# Patient Record
Sex: MALE | Race: WHITE | NOT HISPANIC OR LATINO | URBAN - METROPOLITAN AREA
[De-identification: names, ages, dates, MRNs, and addresses within clinical notes are randomized per-mention and may not be internally consistent; named-entity substitution may affect disease eponyms.]

---

## 2019-08-06 ENCOUNTER — OFFICE VISIT (OUTPATIENT)
Dept: FAMILY MEDICINE | Facility: CLINIC | Age: 59
End: 2019-08-06
Payer: COMMERCIAL

## 2019-08-06 VITALS
BODY MASS INDEX: 28.88 KG/M2 | DIASTOLIC BLOOD PRESSURE: 90 MMHG | HEIGHT: 70 IN | SYSTOLIC BLOOD PRESSURE: 126 MMHG | TEMPERATURE: 98 F | OXYGEN SATURATION: 96 % | WEIGHT: 201.75 LBS | HEART RATE: 77 BPM

## 2019-08-06 DIAGNOSIS — E11.9 TYPE 2 DIABETES MELLITUS WITHOUT COMPLICATION, WITHOUT LONG-TERM CURRENT USE OF INSULIN: ICD-10-CM

## 2019-08-06 DIAGNOSIS — I10 ESSENTIAL HYPERTENSION: Primary | ICD-10-CM

## 2019-08-06 DIAGNOSIS — C09.9 SQUAMOUS CELL CARCINOMA OF LEFT TONSIL: ICD-10-CM

## 2019-08-06 PROCEDURE — 99999 PR PBB SHADOW E&M-NEW PATIENT-LVL IV: CPT | Mod: PBBFAC,,, | Performed by: INTERNAL MEDICINE

## 2019-08-06 PROCEDURE — 3008F BODY MASS INDEX DOCD: CPT | Mod: CPTII,S$GLB,, | Performed by: INTERNAL MEDICINE

## 2019-08-06 PROCEDURE — 3008F PR BODY MASS INDEX (BMI) DOCUMENTED: ICD-10-PCS | Mod: CPTII,S$GLB,, | Performed by: INTERNAL MEDICINE

## 2019-08-06 PROCEDURE — 99203 PR OFFICE/OUTPT VISIT, NEW, LEVL III, 30-44 MIN: ICD-10-PCS | Mod: S$GLB,,, | Performed by: INTERNAL MEDICINE

## 2019-08-06 PROCEDURE — 99203 OFFICE O/P NEW LOW 30 MIN: CPT | Mod: S$GLB,,, | Performed by: INTERNAL MEDICINE

## 2019-08-06 PROCEDURE — 99999 PR PBB SHADOW E&M-NEW PATIENT-LVL IV: ICD-10-PCS | Mod: PBBFAC,,, | Performed by: INTERNAL MEDICINE

## 2019-08-06 RX ORDER — METFORMIN HYDROCHLORIDE 500 MG/1
500 TABLET ORAL
Refills: 2 | COMMUNITY
Start: 2019-07-25 | End: 2019-10-21 | Stop reason: SDUPTHER

## 2019-08-06 RX ORDER — HYDROCHLOROTHIAZIDE 12.5 MG/1
12.5 TABLET ORAL
COMMUNITY
Start: 2019-07-16 | End: 2019-10-21 | Stop reason: SDUPTHER

## 2019-08-06 RX ORDER — LOSARTAN POTASSIUM 100 MG/1
100 TABLET ORAL
Refills: 1 | COMMUNITY
Start: 2019-07-23 | End: 2019-09-23 | Stop reason: SDUPTHER

## 2019-08-06 NOTE — PROGRESS NOTES
[facility] Note    Chief Complaint      Chief Complaint   Patient presents with    [facility]     new pcp    Hypertension     check up    Diabetes     check up       History of Present Illness      Cristhian Gregory is a 58 y.o. male who presents today for   Chief Complaint   Patient presents with    [facility]     new pcp    Hypertension     check up    Diabetes     check up       No recent PCP.  Has been seeing ENT for cancer diagnosis.  Had elevated BP on preop exam.  Started on losartan and HCTZ by ENT.  Also had elevated  on preop labs.  No previous diabetes diagnosis.  Started on metformin.      Will be getting dental procedure and needs clearance.      Problem List Items Addressed This Visit     None      Visit Diagnoses     Essential hypertension    -  Primary    Relevant Orders    CBC auto differential    Comprehensive metabolic panel    Hemoglobin A1c    Microalbumin/creatinine urine ratio    TSH    T4, free    Ambulatory consult to Diabetic Education    Type 2 diabetes mellitus without complication, without long-term current use of insulin        Relevant Medications    metFORMIN (GLUCOPHAGE) 500 MG tablet    Other Relevant Orders    CBC auto differential    Comprehensive metabolic panel    Hemoglobin A1c    Microalbumin/creatinine urine ratio    TSH    T4, free    Ambulatory consult to Diabetic Education    Squamous cell carcinoma of left tonsil        Relevant Orders    CBC auto differential    Comprehensive metabolic panel    TSH    T4, free        HTN: BP better controlled on losartan, hctz.  Home monitoring reviewed with pt.  110-120s/70-80s.    DM2: new diagnosis.  Started on metformin.  A1C due.  Microalbuminuria due.  Diabetes education referral.  Eye exam UTD with [facility] 2019.    SCC, throat 2012: Sees Dr. Warren, ENT.  Had radiation with Dr. Gastelum for lymph node involvement.  Surveillance at this point twice yearly with Dr. Warren.      Past  Medical History:  Past Medical History:   Diagnosis Date    Diabetes mellitus, type 2     Hypertension        Past Surgical History:  History reviewed. No pertinent surgical history.    Family History:  family history includes Diabetes in his mother; Heart disease in his father; No Known Problems in his brother and sister.     Social History:  Social History     Socioeconomic History    Marital status:      Spouse name: Not on file    Number of children: Not on file    Years of education: Not on file    Highest education level: Not on file   Occupational History    Not on file   Social Needs    Financial resource strain: Not on file    Food insecurity:     Worry: Not on file     Inability: Not on file    Transportation needs:     Medical: Not on file     Non-medical: Not on file   Tobacco Use    Smoking status: Never Smoker   Substance and Sexual Activity    Alcohol use: Never     Frequency: Never    Drug use: Never    Sexual activity: Not on file   Lifestyle    Physical activity:     Days per week: Not on file     Minutes per session: Not on file    Stress: Not on file   Relationships    Social connections:     Talks on phone: Not on file     Gets together: Not on file     Attends Congregational service: Not on file     Active member of club or organization: Not on file     Attends meetings of clubs or organizations: Not on file     Relationship status: Not on file   Other Topics Concern    Not on file   Social History Narrative    Not on file       Review of Systems   Constitutional: Negative for chills, fever and malaise/fatigue.   Respiratory: Negative for shortness of breath.    Cardiovascular: Negative for chest pain.   Gastrointestinal: Negative for constipation, diarrhea, nausea and vomiting.   Skin: Negative for rash.   Neurological: Negative for weakness.        Medications:  Outpatient Encounter Medications as of 8/6/2019   Medication Sig Dispense Refill    hydroCHLOROthiazide  "(HYDRODIURIL) 12.5 MG Tab 12.5 mg. 1 TAB PO QD      losartan (COZAAR) 100 MG tablet 100 mg. 1 tab po qd  1    metFORMIN (GLUCOPHAGE) 500 MG tablet 500 mg. 1 tab BID  2     No facility-administered encounter medications on file as of 8/6/2019.        Allergies:  Review of patient's allergies indicates:   Allergen Reactions    Ampicillin        Health Maintenance:  Health Maintenance   Topic Date Due    Hepatitis C Screening  1960    Lipid Panel  1960    TETANUS VACCINE  12/19/1980    Colonoscopy  12/19/2012     The patient has no Health Maintenance topics of status Not Due    Physical Exam      Vital Signs  Temp: 97.5 °F (36.4 °C)  Temp src: Oral  Pulse: 77  SpO2: 96 %  BP: (!) 126/90  BP Location: Right arm  Patient Position: Sitting  Pain Score: 0-No pain  Height and Weight  Height: 5' 10" (177.8 cm)  Weight: 91.5 kg (201 lb 11.5 oz)  BSA (Calculated - sq m): 2.13 sq meters  BMI (Calculated): 29  Weight in (lb) to have BMI = 25: 173.9]    Physical Exam   Constitutional: He is oriented to person, place, and time. He appears well-developed and well-nourished.   HENT:   Head: Normocephalic and atraumatic.   Right Ear: External ear normal.   Left Ear: External ear normal.   Mouth/Throat: Oropharynx is clear and moist.   Eyes: Pupils are equal, round, and reactive to light. Conjunctivae and EOM are normal.   Cardiovascular: Normal rate, regular rhythm, normal heart sounds and intact distal pulses.   No murmur heard.  Pulmonary/Chest: Effort normal and breath sounds normal. He has no wheezes. He has no rales.   Abdominal: Soft. Bowel sounds are normal.   Musculoskeletal: Normal range of motion. He exhibits no edema.   Neurological: He is alert and oriented to person, place, and time.   Skin: No rash noted.   Psychiatric: He has a normal mood and affect. His behavior is normal.   Nursing note and vitals reviewed.       Laboratory:  CBC:  No results for input(s): WBC, RBC, HGB, HCT, PLT, MCV, MCH, MCHC " in the last 2160 hours.  CMP:  No results for input(s): GLU, CALCIUM, ALBUMIN, PROT, NA, K, CO2, CL, BUN, ALKPHOS, ALT, AST, BILITOT in the last 2160 hours.    Invalid input(s): CREATININ  URINALYSIS:  No results for input(s): COLORU, CLARITYU, SPECGRAV, PHUR, PROTEINUA, GLUCOSEU, BILIRUBINCON, BLOODU, WBCU, RBCU, BACTERIA, MUCUS, NITRITE, LEUKOCYTESUR, UROBILINOGEN, HYALINECASTS in the last 2160 hours.   LIPIDS:  No results for input(s): TSH, HDL, CHOL, TRIG, LDLCALC, CHOLHDL, NONHDLCHOL, TOTALCHOLEST in the last 2160 hours.  TSH:  No results for input(s): TSH in the last 2160 hours.  A1C:  No results for input(s): HGBA1C in the last 2160 hours.    Radiology:      Assessment/Plan     Cristhian Gregory is a 58 y.o.male with:    Essential hypertension, improving  -     CBC auto differential; Future; Expected date: 08/06/2019  -     Comprehensive metabolic panel; Future; Expected date: 08/06/2019  -     Hemoglobin A1c; Future; Expected date: 08/06/2019  -     Microalbumin/creatinine urine ratio  -     TSH; Future; Expected date: 08/06/2019  -     T4, free; Future; Expected date: 08/06/2019  -     Ambulatory consult to Diabetic Education    Type 2 diabetes mellitus without complication, without long-term current use of insulin, improving  -     CBC auto differential; Future; Expected date: 08/06/2019  -     Comprehensive metabolic panel; Future; Expected date: 08/06/2019  -     Hemoglobin A1c; Future; Expected date: 08/06/2019  -     Microalbumin/creatinine urine ratio  -     TSH; Future; Expected date: 08/06/2019  -     T4, free; Future; Expected date: 08/06/2019  -     Ambulatory consult to Diabetic Education    Squamous cell carcinoma of left tonsil, stable  -     CBC auto differential; Future; Expected date: 08/06/2019  -     Comprehensive metabolic panel; Future; Expected date: 08/06/2019  -     TSH; Future; Expected date: 08/06/2019  -     T4, free; Future; Expected date: 08/06/2019        As above, continue  current medications and maintain follow up with specialists.  Will clear for dental procedure pending labs.  Return to clinic in 3 months.    Bethel Echols MD  [facility]

## 2019-08-07 ENCOUNTER — LAB VISIT (OUTPATIENT)
Dept: LAB | Facility: HOSPITAL | Age: 59
End: 2019-08-07
Attending: INTERNAL MEDICINE
Payer: COMMERCIAL

## 2019-08-07 ENCOUNTER — DOCUMENTATION ONLY (OUTPATIENT)
Dept: FAMILY MEDICINE | Facility: CLINIC | Age: 59
End: 2019-08-07

## 2019-08-07 ENCOUNTER — TELEPHONE (OUTPATIENT)
Dept: FAMILY MEDICINE | Facility: CLINIC | Age: 59
End: 2019-08-07

## 2019-08-07 DIAGNOSIS — E11.9 TYPE 2 DIABETES MELLITUS WITHOUT COMPLICATION, WITHOUT LONG-TERM CURRENT USE OF INSULIN: ICD-10-CM

## 2019-08-07 DIAGNOSIS — C09.9 SQUAMOUS CELL CARCINOMA OF LEFT TONSIL: ICD-10-CM

## 2019-08-07 DIAGNOSIS — I10 ESSENTIAL HYPERTENSION: ICD-10-CM

## 2019-08-07 LAB
ALBUMIN SERPL BCP-MCNC: 4.1 G/DL (ref 3.5–5.2)
ALP SERPL-CCNC: 55 U/L (ref 55–135)
ALT SERPL W/O P-5'-P-CCNC: 26 U/L (ref 10–44)
ANION GAP SERPL CALC-SCNC: 11 MMOL/L (ref 8–16)
AST SERPL-CCNC: 21 U/L (ref 10–40)
BASOPHILS # BLD AUTO: 0.02 K/UL (ref 0–0.2)
BASOPHILS NFR BLD: 0.4 % (ref 0–1.9)
BILIRUB SERPL-MCNC: 0.5 MG/DL (ref 0.1–1)
BUN SERPL-MCNC: 22 MG/DL (ref 6–20)
CALCIUM SERPL-MCNC: 9.7 MG/DL (ref 8.7–10.5)
CHLORIDE SERPL-SCNC: 101 MMOL/L (ref 95–110)
CO2 SERPL-SCNC: 26 MMOL/L (ref 23–29)
CREAT SERPL-MCNC: 1.3 MG/DL (ref 0.5–1.4)
DIFFERENTIAL METHOD: NORMAL
EOSINOPHIL # BLD AUTO: 0.1 K/UL (ref 0–0.5)
EOSINOPHIL NFR BLD: 1.9 % (ref 0–8)
ERYTHROCYTE [DISTWIDTH] IN BLOOD BY AUTOMATED COUNT: 12.6 % (ref 11.5–14.5)
EST. GFR  (AFRICAN AMERICAN): >60 ML/MIN/1.73 M^2
EST. GFR  (NON AFRICAN AMERICAN): >60 ML/MIN/1.73 M^2
ESTIMATED AVG GLUCOSE: 183 MG/DL (ref 68–131)
GLUCOSE SERPL-MCNC: 149 MG/DL (ref 70–110)
HBA1C MFR BLD HPLC: 8 % (ref 4–5.6)
HCT VFR BLD AUTO: 44.8 % (ref 40–54)
HGB BLD-MCNC: 14.9 G/DL (ref 14–18)
IMM GRANULOCYTES # BLD AUTO: 0.01 K/UL (ref 0–0.04)
IMM GRANULOCYTES NFR BLD AUTO: 0.2 % (ref 0–0.5)
LYMPHOCYTES # BLD AUTO: 1.9 K/UL (ref 1–4.8)
LYMPHOCYTES NFR BLD: 32.8 % (ref 18–48)
MCH RBC QN AUTO: 28.4 PG (ref 27–31)
MCHC RBC AUTO-ENTMCNC: 33.3 G/DL (ref 32–36)
MCV RBC AUTO: 85 FL (ref 82–98)
MONOCYTES # BLD AUTO: 0.5 K/UL (ref 0.3–1)
MONOCYTES NFR BLD: 9.3 % (ref 4–15)
NEUTROPHILS # BLD AUTO: 3.2 K/UL (ref 1.8–7.7)
NEUTROPHILS NFR BLD: 55.4 % (ref 38–73)
NRBC BLD-RTO: 0 /100 WBC
PLATELET # BLD AUTO: 242 K/UL (ref 150–350)
PMV BLD AUTO: 10.8 FL (ref 9.2–12.9)
POTASSIUM SERPL-SCNC: 4 MMOL/L (ref 3.5–5.1)
PROT SERPL-MCNC: 7.7 G/DL (ref 6–8.4)
RBC # BLD AUTO: 5.25 M/UL (ref 4.6–6.2)
SODIUM SERPL-SCNC: 138 MMOL/L (ref 136–145)
T4 FREE SERPL-MCNC: 0.91 NG/DL (ref 0.71–1.51)
TSH SERPL DL<=0.005 MIU/L-ACNC: 2.05 UIU/ML (ref 0.4–4)
WBC # BLD AUTO: 5.7 K/UL (ref 3.9–12.7)

## 2019-08-07 PROCEDURE — 84439 ASSAY OF FREE THYROXINE: CPT

## 2019-08-07 PROCEDURE — 83036 HEMOGLOBIN GLYCOSYLATED A1C: CPT

## 2019-08-07 PROCEDURE — 84443 ASSAY THYROID STIM HORMONE: CPT

## 2019-08-07 PROCEDURE — 80053 COMPREHEN METABOLIC PANEL: CPT

## 2019-08-07 PROCEDURE — 85025 COMPLETE CBC W/AUTO DIFF WBC: CPT

## 2019-08-07 PROCEDURE — 36415 COLL VENOUS BLD VENIPUNCTURE: CPT | Mod: PN

## 2019-08-07 NOTE — TELEPHONE ENCOUNTER
----- Message from Bethel Echols MD sent at 8/7/2019  3:37 PM CDT -----  A1C is 8.0 which is elevated as expected.  This test is a 3 month average so starting metformin will need more time to show up in results.  Also will be seeing diabetic education which will help as well.  Cont current meds.  Recheck in 3 months as planned.

## 2019-08-13 ENCOUNTER — DOCUMENTATION ONLY (OUTPATIENT)
Dept: FAMILY MEDICINE | Facility: CLINIC | Age: 59
End: 2019-08-13

## 2019-08-20 ENCOUNTER — PATIENT MESSAGE (OUTPATIENT)
Dept: PRIMARY CARE CLINIC | Facility: CLINIC | Age: 59
End: 2019-08-20

## 2019-08-28 ENCOUNTER — TELEPHONE (OUTPATIENT)
Dept: FAMILY MEDICINE | Facility: CLINIC | Age: 59
End: 2019-08-28

## 2019-08-28 NOTE — TELEPHONE ENCOUNTER
Discussed with pt and Dr. Wakefield.  BP elevated during office visit.  Home readings have been great.  Not sure what caused this spike other than nervousness about getting dental work done.  Recommended pt come in for BP check on Friday at [facility].

## 2019-08-28 NOTE — TELEPHONE ENCOUNTER
----- Message from Eladia Cordova MA sent at 8/28/2019  9:30 AM CDT -----  Contact: Dr Sherwin Wakefield      ----- Message -----  From: Jose Mixon  Sent: 8/28/2019   9:24 AM  To: [facility] Staff    Dr Wakefield office called in about wanting to speak with . Pt is in the office right now        Dr office can be reached at [phone]        TY   no

## 2019-08-30 ENCOUNTER — CLINICAL SUPPORT (OUTPATIENT)
Dept: PRIMARY CARE CLINIC | Facility: CLINIC | Age: 59
End: 2019-08-30
Payer: COMMERCIAL

## 2019-08-30 ENCOUNTER — TELEPHONE (OUTPATIENT)
Dept: PRIMARY CARE CLINIC | Facility: CLINIC | Age: 59
End: 2019-08-30

## 2019-08-30 VITALS — SYSTOLIC BLOOD PRESSURE: 128 MMHG | DIASTOLIC BLOOD PRESSURE: 88 MMHG

## 2019-08-30 DIAGNOSIS — I10 ESSENTIAL HYPERTENSION: Primary | ICD-10-CM

## 2019-08-30 PROCEDURE — 99999 PR PBB SHADOW E&M-EST. PATIENT-LVL I: ICD-10-PCS | Mod: PBBFAC,,,

## 2019-08-30 PROCEDURE — 99999 PR PBB SHADOW E&M-EST. PATIENT-LVL I: CPT | Mod: PBBFAC,,,

## 2019-09-17 ENCOUNTER — TELEPHONE (OUTPATIENT)
Dept: FAMILY MEDICINE | Facility: CLINIC | Age: 59
End: 2019-09-17

## 2019-09-17 ENCOUNTER — CLINICAL SUPPORT (OUTPATIENT)
Dept: DIABETES | Facility: CLINIC | Age: 59
End: 2019-09-17
Payer: COMMERCIAL

## 2019-09-17 DIAGNOSIS — E11.9 TYPE 2 DIABETES MELLITUS WITHOUT COMPLICATION, WITHOUT LONG-TERM CURRENT USE OF INSULIN: ICD-10-CM

## 2019-09-17 DIAGNOSIS — E11.65 UNCONTROLLED TYPE 2 DIABETES MELLITUS WITH HYPERGLYCEMIA: Primary | ICD-10-CM

## 2019-09-17 PROCEDURE — G0108 PR DIAB MANAGE TRN  PER INDIV: ICD-10-PCS | Mod: S$GLB,,, | Performed by: DIETITIAN, REGISTERED

## 2019-09-17 PROCEDURE — G0108 DIAB MANAGE TRN  PER INDIV: HCPCS | Mod: S$GLB,,, | Performed by: DIETITIAN, REGISTERED

## 2019-09-17 RX ORDER — INSULIN PUMP SYRINGE, 3 ML
EACH MISCELLANEOUS
Qty: 1 EACH | Refills: 0 | Status: SHIPPED | OUTPATIENT
Start: 2019-09-17 | End: 2020-09-16

## 2019-09-17 RX ORDER — LANCETS
1 EACH MISCELLANEOUS DAILY
Qty: 100 EACH | Refills: 11 | Status: SHIPPED | OUTPATIENT
Start: 2019-09-17 | End: 2020-09-21 | Stop reason: SDUPTHER

## 2019-09-17 NOTE — PROGRESS NOTES
Diabetes Education  Author: Yulissa Pineda RD, CDE  Date: 9/17/2019    Diabetes Care Management Summary  Diabetes Education Record Assessment/Progress: Initial     Diabetes Type  Diabetes Type : Type II    Diabetes History  Diabetes Diagnosis: 0-1 year  Current Treatment: Oral Medication    Health Maintenance was reviewed today with patient. Discussed with patient importance of routine eye exams, foot exams/foot care, blood work (i.e.: A1c, microalbumin, and lipid), dental visits, yearly flu vaccine, and pneumonia vaccine as indicated by PCP. Patient verbalized understanding.     Health Maintenance Topics with due status: Not Due       Topic Last Completion Date    Hemoglobin A1c 08/07/2019     Health Maintenance Due   Topic Date Due    Hepatitis C Screening  1960    Lipid Panel  1962    Foot Exam  12/19/1972    Eye Exam  12/19/1972    TETANUS VACCINE  12/19/1980    Pneumococcal Vaccine (Highest Risk) (1 of 3 - PCV13) 12/19/1981    Low Dose Statin  12/19/1983    Colonoscopy  12/19/2012     Nutrition  Meal Planning: 3 meals per day, snacks between meal, water, eats out seldom  What type of sweetener do you use?: none, honey  What type of beverages do you drink?: water, sport drinks, other (see comments)(SF sport drinks, vitamin water, Martha; coffee, tea; u/s almond milk)    Monitoring   Self Monitoring : (None - needs a meter)  Blood Glucose Logs: No  Do you use a personal continuous glucose monitor?: No  In the last month, how often have you had a low blood sugar reaction?: never  What are your symptoms of low blood sugar?: (N/A)  How do you treat low blood sugar?: (N/A)  Can you tell when your blood sugar is too high?: yes(Fatigue)  How do you treat high blood sugar?: (None)    Exercise   Exercise Type: (Plays golf, swim and active with job)    Current Diabetes Treatment   Current Treatment: Oral Medication    Social History  Preferred Learning Method: Face to Face  Primary Support:  Self  Smoking Status: Never a Smoker  Alcohol Use: Never    Barriers to Change  Barriers to Change: None  Learning Challenges : None    Readiness to Learn   Readiness to Learn : Acceptance    Cultural Influences  Cultural Influences: No    Diabetes Education Assessment/Progress  Diabetes Disease Process (diabetes disease process and treatment options): Instructed, Discussion, Individual Session, Written Materials Provided, Demonstrates Understanding/Competency(verbalizes/demonstrates)  Nutrition (Incorporating nutritional management into one's lifestyle): Instructed, Discussion, Individual Session, Written Materials Provided, Demonstrates Understanding/Competency (verbalizes/demonstrates)(Reviewed CHO counting, label reading and addt'l resources to assist w/ CHO counting; plate method reviewed)  Physical Activity (incorporating physical activity into one's lifestyle): Instructed, Discussion, Individual Session, Written Materials Provided, Demonstrates Understanding/Competency (verbalizes/demonstrates)(Reviewed goals and benefits)  Medications (states correct name, dose, onset, peak, duration, side effects & timing of meds): Instructed, Discussion, Individual Session, Written Materials Provided, Demonstrates Understanding/Competency(verbalizes/demonstrates)(Reviewed medication regimen)  Monitoring (monitoring blood glucose/other parameters & using results): Instructed, Discussion, Individual Session, Written Materials Provided, Demonstrates Understanding/Competency (verbalizes/demonstrates)(Reviewed SMBG schedule and BG goals; Rx request sent to PCP for meter/supplies)  Acute Complications (preventing, detecting, and treating acute complications): Instructed, Discussion, Individual Session, Written Materials Provided, Demonstrates Understanding/Competency (verbalizes/demonstrates)(Reviewed s/s and treatment of hypoglycemia)  Chronic Complications (preventing, detecting, and treating chronic complications):  Instructed, Discussion, Individual Session, Written Materials Provided, Demonstrates Understanding/Competency (verbalizes/demonstrates)(Reviewed standards of care)  Clinical (diabetes, other pertinent medical history, and relevant comorbidities reviewed during visit): Discussion, Comprehends Key Points  Cognitive (knowledge of self-management skills, functional health literacy): Discussion, Comprehends Key Points  Psychosocial (emotional response to diabetes): Discussion, Comprehends Key Points  Diabetes Distress and Support Systems: Not Covered/Deferred(New Onset/Time constraints)  Behavioral (readiness for change, lifestyle practices, self-care behaviors): Discussion, Comprehends Key Points    Goals  Patient has selected/evaluated goals during today's session: Yes, selected  Monitoring: Set(Check BG daily)    Diabetes Care Plan/Intervention  Education Plan/Intervention: Individual Follow-Up DSMT    Diabetes Meal Plan  Carbohydrate Per Meal: 30-45g  Carbohydrate Per Snack : 7-15g    Today's Self-Management Care Plan was developed with the patient's input and is based on barriers identified during today's assessment.    The long and short-term goals in the care plan were written with the patient/caregiver's input. The patient has agreed to work toward these goals to improve his overall diabetes control.      The patient received a copy of today's self-management plan and verbalized understanding of the care plan, goals, and all of today's instructions.      The patient was encouraged to communicate with his physician and care team regarding his condition(s) and treatment.  I provided the patient with my contact information today and encouraged him to contact me via phone or patient portal as needed.     Education Units of Time   Time Spent: 60 min

## 2019-09-18 NOTE — TELEPHONE ENCOUNTER
----- Message from Jose Mixon sent at 9/18/2019  9:30 AM CDT -----  Contact: [facility] - Dentist  Dr Courtney called in about wanting to speak with .  would like to discuss something with dr about pt        Dr Courtney can be reached at [phone] ty

## 2019-09-18 NOTE — TELEPHONE ENCOUNTER
Called and discussed with patient and his dentist.  Will take an extra half of losartan evening before dental work.  Otherwise BPs have been great

## 2019-09-23 ENCOUNTER — TELEPHONE (OUTPATIENT)
Dept: FAMILY MEDICINE | Facility: CLINIC | Age: 59
End: 2019-09-23

## 2019-09-23 RX ORDER — LOSARTAN POTASSIUM 100 MG/1
100 TABLET ORAL DAILY
Qty: 30 TABLET | Refills: 5 | Status: SHIPPED | OUTPATIENT
Start: 2019-09-23 | End: 2019-10-21 | Stop reason: SDUPTHER

## 2019-09-23 NOTE — TELEPHONE ENCOUNTER
----- Message from Doris Solis sent at 9/23/2019  4:14 PM CDT -----  Contact: Patient [phone]  Patient has an abscess tooth and needs clearance.    Please call and advise.    Thank You

## 2019-09-23 NOTE — TELEPHONE ENCOUNTER
On Friday the dentist rx'd an abx (clindamycin 150mg 1 po qid) for an abscess on his tooth. Pt just needs a clearance to have the tooth extracted because he was recently dx'd with diabetes. Dentist is , [phone]    Pt is also asking for refill on losartan 100mg 1 po qd to be sent to [facility] in chart.

## 2019-09-24 NOTE — TELEPHONE ENCOUNTER
Tried calling back, no answer, left message.  What has his blood sugars been running for the past week? It doesn't sound like this extraction is elective so we should be able to karlie clearance quickly.

## 2019-09-24 NOTE — TELEPHONE ENCOUNTER
----- Message from Bertha Murphy sent at 9/24/2019 12:23 PM CDT -----  Contact: PT  Pt would like to be called back regarding a tooth abstract     Pt can be reached at  [phone]

## 2019-09-25 ENCOUNTER — PATIENT MESSAGE (OUTPATIENT)
Dept: PRIMARY CARE CLINIC | Facility: CLINIC | Age: 59
End: 2019-09-25

## 2019-09-27 DIAGNOSIS — E11.9 TYPE 2 DIABETES MELLITUS WITHOUT COMPLICATION: ICD-10-CM

## 2019-10-21 ENCOUNTER — PATIENT MESSAGE (OUTPATIENT)
Dept: PRIMARY CARE CLINIC | Facility: CLINIC | Age: 59
End: 2019-10-21

## 2019-10-21 RX ORDER — HYDROCHLOROTHIAZIDE 12.5 MG/1
12.5 TABLET ORAL DAILY
Qty: 30 TABLET | Refills: 5 | Status: SHIPPED | OUTPATIENT
Start: 2019-10-21 | End: 2020-04-30 | Stop reason: SDUPTHER

## 2019-10-21 RX ORDER — METFORMIN HYDROCHLORIDE 500 MG/1
500 TABLET ORAL 2 TIMES DAILY WITH MEALS
Qty: 60 TABLET | Refills: 5 | Status: SHIPPED | OUTPATIENT
Start: 2019-10-21 | End: 2020-06-19 | Stop reason: SDUPTHER

## 2019-10-21 RX ORDER — LOSARTAN POTASSIUM 100 MG/1
100 TABLET ORAL DAILY
Qty: 30 TABLET | Refills: 5 | Status: SHIPPED | OUTPATIENT
Start: 2019-10-21 | End: 2020-04-17 | Stop reason: SDUPTHER

## 2019-10-25 ENCOUNTER — PATIENT OUTREACH (OUTPATIENT)
Dept: ADMINISTRATIVE | Facility: HOSPITAL | Age: 59
End: 2019-10-25

## 2019-10-25 DIAGNOSIS — E11.9 TYPE 2 DIABETES MELLITUS WITHOUT COMPLICATION, WITHOUT LONG-TERM CURRENT USE OF INSULIN: Primary | ICD-10-CM

## 2019-10-25 DIAGNOSIS — Z11.59 NEED FOR HEPATITIS C SCREENING TEST: ICD-10-CM

## 2019-11-19 ENCOUNTER — TELEPHONE (OUTPATIENT)
Dept: FAMILY MEDICINE | Facility: CLINIC | Age: 59
End: 2019-11-19

## 2019-11-19 NOTE — TELEPHONE ENCOUNTER
----- Message from Bethel Echols MD sent at 11/19/2019  2:30 PM CST -----  Regarding: Scheduling labs  Can we find out when and where pt intends to have his repeat labs drawn?  He has a lipid panel, repeat A1C and hep C antibody to be done.  Has appt on 11/25

## 2019-11-25 ENCOUNTER — IMMUNIZATION (OUTPATIENT)
Dept: PHARMACY | Facility: CLINIC | Age: 59
End: 2019-11-25

## 2019-11-25 ENCOUNTER — OFFICE VISIT (OUTPATIENT)
Dept: PRIMARY CARE CLINIC | Facility: CLINIC | Age: 59
End: 2019-11-25
Payer: COMMERCIAL

## 2019-11-25 ENCOUNTER — LAB VISIT (OUTPATIENT)
Dept: LAB | Facility: HOSPITAL | Age: 59
End: 2019-11-25
Attending: INTERNAL MEDICINE
Payer: COMMERCIAL

## 2019-11-25 ENCOUNTER — IMMUNIZATION (OUTPATIENT)
Dept: PHARMACY | Facility: CLINIC | Age: 59
End: 2019-11-25
Payer: COMMERCIAL

## 2019-11-25 VITALS
SYSTOLIC BLOOD PRESSURE: 124 MMHG | WEIGHT: 199.31 LBS | TEMPERATURE: 98 F | BODY MASS INDEX: 28.53 KG/M2 | HEART RATE: 87 BPM | OXYGEN SATURATION: 97 % | DIASTOLIC BLOOD PRESSURE: 80 MMHG | HEIGHT: 70 IN

## 2019-11-25 DIAGNOSIS — E11.9 TYPE 2 DIABETES MELLITUS WITHOUT COMPLICATION, WITHOUT LONG-TERM CURRENT USE OF INSULIN: ICD-10-CM

## 2019-11-25 DIAGNOSIS — Z11.59 NEED FOR HEPATITIS C SCREENING TEST: ICD-10-CM

## 2019-11-25 DIAGNOSIS — K02.9 DENTAL CARIES: Primary | ICD-10-CM

## 2019-11-25 DIAGNOSIS — I10 ESSENTIAL HYPERTENSION: ICD-10-CM

## 2019-11-25 DIAGNOSIS — Z12.11 SCREEN FOR COLON CANCER: ICD-10-CM

## 2019-11-25 DIAGNOSIS — C09.9 SQUAMOUS CELL CARCINOMA OF LEFT TONSIL: ICD-10-CM

## 2019-11-25 DIAGNOSIS — E11.9 TYPE 2 DIABETES MELLITUS WITHOUT COMPLICATION: ICD-10-CM

## 2019-11-25 LAB
CHOLEST SERPL-MCNC: 204 MG/DL (ref 120–199)
CHOLEST/HDLC SERPL: 5.5 {RATIO} (ref 2–5)
ESTIMATED AVG GLUCOSE: 134 MG/DL (ref 68–131)
HBA1C MFR BLD HPLC: 6.3 % (ref 4–5.6)
HDLC SERPL-MCNC: 37 MG/DL (ref 40–75)
HDLC SERPL: 18.1 % (ref 20–50)
LDLC SERPL CALC-MCNC: 120.8 MG/DL (ref 63–159)
NONHDLC SERPL-MCNC: 167 MG/DL
TRIGL SERPL-MCNC: 231 MG/DL (ref 30–150)

## 2019-11-25 PROCEDURE — 99214 PR OFFICE/OUTPT VISIT, EST, LEVL IV, 30-39 MIN: ICD-10-PCS | Mod: S$GLB,,, | Performed by: INTERNAL MEDICINE

## 2019-11-25 PROCEDURE — 99999 PR PBB SHADOW E&M-EST. PATIENT-LVL IV: ICD-10-PCS | Mod: PBBFAC,,, | Performed by: INTERNAL MEDICINE

## 2019-11-25 PROCEDURE — 83036 HEMOGLOBIN GLYCOSYLATED A1C: CPT

## 2019-11-25 PROCEDURE — 3008F BODY MASS INDEX DOCD: CPT | Mod: CPTII,S$GLB,, | Performed by: INTERNAL MEDICINE

## 2019-11-25 PROCEDURE — 80061 LIPID PANEL: CPT

## 2019-11-25 PROCEDURE — 3008F PR BODY MASS INDEX (BMI) DOCUMENTED: ICD-10-PCS | Mod: CPTII,S$GLB,, | Performed by: INTERNAL MEDICINE

## 2019-11-25 PROCEDURE — 99214 OFFICE O/P EST MOD 30 MIN: CPT | Mod: S$GLB,,, | Performed by: INTERNAL MEDICINE

## 2019-11-25 PROCEDURE — 36415 COLL VENOUS BLD VENIPUNCTURE: CPT | Mod: PN

## 2019-11-25 PROCEDURE — 99999 PR PBB SHADOW E&M-EST. PATIENT-LVL IV: CPT | Mod: PBBFAC,,, | Performed by: INTERNAL MEDICINE

## 2019-11-25 PROCEDURE — 86803 HEPATITIS C AB TEST: CPT

## 2019-11-25 RX ORDER — HYDROCODONE BITARTRATE AND ACETAMINOPHEN 7.5; 325 MG/1; MG/1
1 TABLET ORAL EVERY 4 HOURS PRN
Refills: 0 | COMMUNITY
Start: 2019-11-21 | End: 2020-03-02

## 2019-11-25 RX ORDER — IBUPROFEN 600 MG/1
600 TABLET ORAL EVERY 6 HOURS PRN
Refills: 0 | COMMUNITY
Start: 2019-11-21 | End: 2020-03-02

## 2019-11-25 NOTE — PROGRESS NOTES
[facility] Note    Chief Complaint      Chief Complaint   Patient presents with    Diabetes     3 month follow up       History of Present Illness      Cristhian Gregory is a 58 y.o. male with chronic conditions of DM2, HTN, squamous cell carcinoma left tonsil who presents today for: follow up diabetes. Has been doing well on metformin twice daily.  FBG reviewed.  -144 but have been  throughout the days.  A1C drawn this morning.  Has cut back on carbs and sweets.    Has had dental extractions.  Has more extensive lower extractions and implants scheduled in January.  At this point, his diabetes control has been improved enough to optimize wound healing.  Discussed statin indication.    Eye exam UTD with Dr. Villa, scanned.    HTN: BP at goal on losartan.  Flu shot due.  TdAP due.  Shingles vaccine due age 62.  Pneumovax due, will get at next visit.  Prevnar due age 67.    Cscope due.  Will refer.      Past Medical History:  Past Medical History:   Diagnosis Date    Diabetes mellitus, type 2     Hypertension        Past Surgical History:   has no past surgical history on file.    Family History:  family history includes Cancer in his mother; Diabetes in his mother; Heart disease in his father; No Known Problems in his brother and sister.     Social History:  Social History     Tobacco Use    Smoking status: Never Smoker   Substance Use Topics    Alcohol use: Never     Frequency: Never    Drug use: Never       Review of Systems   Constitutional: Negative for chills, fever, malaise/fatigue and weight loss.   Eyes: Negative for blurred vision.   Respiratory: Negative for shortness of breath.    Cardiovascular: Negative for chest pain.   Gastrointestinal: Negative for constipation, diarrhea, nausea and vomiting.   Skin: Negative for rash.   Neurological: Negative for dizziness, seizures and weakness.   Endo/Heme/Allergies: Negative for polydipsia.        Medications:  Outpatient Encounter  Medications as of 11/25/2019   Medication Sig Dispense Refill    blood sugar diagnostic Strp 1 each by Misc.(Non-Drug; Combo Route) route once daily. 100 each 11    blood-glucose meter kit Use as instructed 1 each 0    hydroCHLOROthiazide (HYDRODIURIL) 12.5 MG Tab Take 1 tablet (12.5 mg total) by mouth once daily. 1 TAB PO QD 30 tablet 5    HYDROcodone-acetaminophen (NORCO) 7.5-325 mg per tablet Take 1 tablet by mouth every 4 (four) hours as needed. for pain.  0    ibuprofen (ADVIL,MOTRIN) 600 MG tablet Take 600 mg by mouth every 6 (six) hours as needed.  0    lancets Misc 1 each by Misc.(Non-Drug; Combo Route) route once daily. 100 each 11    losartan (COZAAR) 100 MG tablet Take 1 tablet (100 mg total) by mouth once daily. 1 tab po qd 30 tablet 5    metFORMIN (GLUCOPHAGE) 500 MG tablet Take 1 tablet (500 mg total) by mouth 2 (two) times daily with meals. 1 tab BID 60 tablet 5    [DISCONTINUED] diphth,pertus,acell,,tetanus (BOOSTRIX TDAP) 2.5-8-5 Lf-mcg-Lf/0.5mL Syrg injection Inject 0.5 mLs into the muscle once. For one dose. for 1 dose 0.5 mL 0    [DISCONTINUED] flu vacc [id number] 6mos up,PF, (FLUARIX QUAD [id number], PF,) 60 mcg (15 mcg x 4)/0.5 mL Syrg Inject 0.5 mLs into the muscle once. For one dose. for 1 dose 0.5 mL 0    [DISCONTINUED] pneumococcal 23-latricia ps (PNEUMOVAX 23) 25 mcg/0.5 mL Inject into the muscle. 0.5 mL 0    [DISCONTINUED] varicella-zoster [id number], PF, (SHINGRIX, PF,) 50 mcg/0.5 mL injection Inject 0.5 mLs into the muscle once. For one dose. for 1 dose 0.5 mL 0     No facility-administered encounter medications on file as of 11/25/2019.        Allergies:  Review of patient's allergies indicates:   Allergen Reactions    Ampicillin        Health Maintenance:    There is no immunization history on file for this patient.   Health Maintenance   Topic Date Due    Hepatitis C Screening  1960    Lipid Panel  1960    Foot Exam  12/19/1972    TETANUS VACCINE  12/19/1980     "Pneumococcal Vaccine (Highest Risk) (1 of 3 - PCV13) 12/19/1981    Low Dose Statin  12/19/1983    Colonoscopy  12/19/2012    Hemoglobin A1c  11/07/2019    Eye Exam  06/26/2020        Physical Exam      Vital Signs  Temp: 97.8 °F (36.6 °C)  Temp src: Oral  Pulse: 87  SpO2: 97 %  BP: 124/80  BP Location: Left arm  Patient Position: Sitting  Height and Weight  Height: 5' 10" (177.8 cm)  Weight: 90.4 kg (199 lb 4.7 oz)  BSA (Calculated - sq m): 2.11 sq meters  BMI (Calculated): 28.6  Weight in (lb) to have BMI = 25: 173.9]    Physical Exam   Constitutional: He appears well-developed and well-nourished.   HENT:   Head: Normocephalic and atraumatic.   Right Ear: External ear normal.   Left Ear: External ear normal.   Mouth/Throat: Oropharynx is clear and moist.   Eyes: Pupils are equal, round, and reactive to light. Conjunctivae and EOM are normal.   Cardiovascular: Normal rate, regular rhythm, normal heart sounds and intact distal pulses.   No murmur heard.  Pulses:       Dorsalis pedis pulses are 2+ on the right side, and 2+ on the left side.        Posterior tibial pulses are 2+ on the right side, and 2+ on the left side.   Pulmonary/Chest: Effort normal and breath sounds normal. He has no wheezes. He has no rales.   Abdominal: Soft. Bowel sounds are normal. He exhibits no distension and no abdominal bruit. There is no hepatosplenomegaly. There is no tenderness.   Musculoskeletal:        Right foot: There is normal range of motion and no deformity.        Left foot: There is normal range of motion and no deformity.   Feet:   Right Foot:   Protective Sensation: 5 sites tested. 5 sites sensed.   Skin Integrity: Negative for ulcer.   Left Foot:   Protective Sensation: 5 sites tested. 5 sites sensed.   Skin Integrity: Negative for ulcer.   Vitals reviewed.       Laboratory:  CBC:  Recent Labs   Lab 08/07/19  0752   WBC 5.70   RBC 5.25   Hemoglobin 14.9   Hematocrit 44.8   Platelets 242   Mean Corpuscular Volume 85 "   Mean Corpuscular Hemoglobin 28.4   Mean Corpuscular Hemoglobin Conc 33.3     CMP:  Recent Labs   Lab 08/07/19  0752   Glucose 149 H   Calcium 9.7   Albumin 4.1   Total Protein 7.7   Sodium 138   Potassium 4.0   CO2 26   Chloride 101   BUN, Bld 22 H   Alkaline Phosphatase 55   ALT 26   AST 21   Total Bilirubin 0.5     URINALYSIS:       LIPIDS:  Recent Labs   Lab 08/07/19  0752   TSH 2.054     TSH:  Recent Labs   Lab 08/07/19  0752   TSH 2.054     A1C:  Recent Labs   Lab 08/07/19  0752   Hemoglobin A1C 8.0 H       Assessment/Plan     Cristhian Gregory is a 58 y.o.male with:    1. Type 2 diabetes mellitus without complication, without long-term current use of insulin  - Basic metabolic panel; Future  - Hemoglobin A1c; Future  Continue current meds.  Waiting on labs drawn this morning.  2. Essential hypertension  Continue current meds.    3. Squamous cell carcinoma of left tonsil  Cont follow up.    4. Dental caries  Cont follow up.   5. Screen for colon cancer  - Ambulatory referral to Gastroenterology       Chronic conditions status updated as per HPI.  Other than changes above, cont current medications and maintain follow up with specialists.  Return to clinic in 3 months.    Bethel Echols MD  [facility]                Answers for HPI/ROS submitted by the patient on 11/25/2019   Diabetes problem  Diabetes type: type 2  MedicAlert ID: No  Disease duration: 4 months  fatigue: No  foot paresthesias: No  foot ulcerations: No  polyphagia: No  polyuria: No  visual change: No  Symptom course: improving  confusion: No  hunger: No  mood changes: No  pallor: No  speech difficulty: No  sweats: No  blackouts: No  hospitalization: No  required assistance: No  required glucagon: No  CVA: No  heart disease: No  nephropathy: No  peripheral neuropathy: No  retinopathy: No  autonomic neuropathy: No  CAD risks: hypertension  Current treatments: oral agent (monotherapy)  Treatment compliance: all of the time  Dose schedule:  pre-breakfast, pre-dinner  Given by: patient  Home blood tests: 1-2 x per day  Monitoring compliance: excellent  Blood glucose trend: decreasing steadily  Weight trend: stable  Current diet: generally healthy  Meal planning: carbohydrate counting  Exercise: daily  Dietitian visit: Yes  Eye exam current: Yes  Sees podiatrist: No

## 2019-11-26 ENCOUNTER — TELEPHONE (OUTPATIENT)
Dept: FAMILY MEDICINE | Facility: CLINIC | Age: 59
End: 2019-11-26

## 2019-11-26 DIAGNOSIS — E11.9 TYPE 2 DIABETES MELLITUS WITHOUT COMPLICATION, WITHOUT LONG-TERM CURRENT USE OF INSULIN: Primary | ICD-10-CM

## 2019-11-26 RX ORDER — ATORVASTATIN CALCIUM 10 MG/1
10 TABLET, FILM COATED ORAL DAILY
Qty: 90 TABLET | Refills: 3 | Status: SHIPPED | OUTPATIENT
Start: 2019-11-26 | End: 2020-11-18 | Stop reason: SDUPTHER

## 2019-11-26 NOTE — PROGRESS NOTES
Diabetes control is excellent.  Ok to stop evening dose of metformin and calling in lipitor to add to morning pills as discussed during office visit.  Plan to recheck in 3 months as ordered

## 2019-11-26 NOTE — TELEPHONE ENCOUNTER
----- Message from Amelia Cordova sent at 11/26/2019  1:22 PM CST -----  Contact: [phone]/ self   Patient called in returning your call. Please advise.

## 2019-11-27 LAB — HCV AB SERPL QL IA: NEGATIVE

## 2020-01-09 ENCOUNTER — PATIENT OUTREACH (OUTPATIENT)
Dept: ADMINISTRATIVE | Facility: HOSPITAL | Age: 60
End: 2020-01-09

## 2020-02-28 ENCOUNTER — LAB VISIT (OUTPATIENT)
Dept: LAB | Facility: HOSPITAL | Age: 60
End: 2020-02-28
Attending: INTERNAL MEDICINE
Payer: COMMERCIAL

## 2020-02-28 DIAGNOSIS — E11.9 TYPE 2 DIABETES MELLITUS WITHOUT COMPLICATION, WITHOUT LONG-TERM CURRENT USE OF INSULIN: ICD-10-CM

## 2020-02-28 LAB
ANION GAP SERPL CALC-SCNC: 8 MMOL/L (ref 8–16)
BUN SERPL-MCNC: 20 MG/DL (ref 6–20)
CALCIUM SERPL-MCNC: 9.4 MG/DL (ref 8.7–10.5)
CHLORIDE SERPL-SCNC: 106 MMOL/L (ref 95–110)
CO2 SERPL-SCNC: 29 MMOL/L (ref 23–29)
CREAT SERPL-MCNC: 1.3 MG/DL (ref 0.5–1.4)
EST. GFR  (AFRICAN AMERICAN): >60 ML/MIN/1.73 M^2
EST. GFR  (NON AFRICAN AMERICAN): >60 ML/MIN/1.73 M^2
ESTIMATED AVG GLUCOSE: 160 MG/DL (ref 68–131)
GLUCOSE SERPL-MCNC: 155 MG/DL (ref 70–110)
HBA1C MFR BLD HPLC: 7.2 % (ref 4–5.6)
POTASSIUM SERPL-SCNC: 3.8 MMOL/L (ref 3.5–5.1)
SODIUM SERPL-SCNC: 143 MMOL/L (ref 136–145)

## 2020-02-28 PROCEDURE — 83036 HEMOGLOBIN GLYCOSYLATED A1C: CPT

## 2020-02-28 PROCEDURE — 80048 BASIC METABOLIC PNL TOTAL CA: CPT

## 2020-02-28 PROCEDURE — 36415 COLL VENOUS BLD VENIPUNCTURE: CPT | Mod: PN

## 2020-03-02 ENCOUNTER — OFFICE VISIT (OUTPATIENT)
Dept: PRIMARY CARE CLINIC | Facility: CLINIC | Age: 60
End: 2020-03-02
Payer: COMMERCIAL

## 2020-03-02 VITALS
HEART RATE: 86 BPM | HEIGHT: 70 IN | TEMPERATURE: 98 F | WEIGHT: 204.56 LBS | DIASTOLIC BLOOD PRESSURE: 65 MMHG | BODY MASS INDEX: 29.29 KG/M2 | SYSTOLIC BLOOD PRESSURE: 122 MMHG | OXYGEN SATURATION: 98 % | RESPIRATION RATE: 18 BRPM

## 2020-03-02 DIAGNOSIS — C09.9 SQUAMOUS CELL CARCINOMA OF LEFT TONSIL: ICD-10-CM

## 2020-03-02 DIAGNOSIS — Z00.00 ANNUAL PHYSICAL EXAM: ICD-10-CM

## 2020-03-02 DIAGNOSIS — I10 ESSENTIAL HYPERTENSION: Primary | ICD-10-CM

## 2020-03-02 DIAGNOSIS — Z23 NEED FOR VACCINATION: ICD-10-CM

## 2020-03-02 DIAGNOSIS — E11.9 TYPE 2 DIABETES MELLITUS WITHOUT COMPLICATION, WITHOUT LONG-TERM CURRENT USE OF INSULIN: ICD-10-CM

## 2020-03-02 PROCEDURE — 3078F DIAST BP <80 MM HG: CPT | Mod: CPTII,S$GLB,, | Performed by: INTERNAL MEDICINE

## 2020-03-02 PROCEDURE — 3008F PR BODY MASS INDEX (BMI) DOCUMENTED: ICD-10-PCS | Mod: CPTII,S$GLB,, | Performed by: INTERNAL MEDICINE

## 2020-03-02 PROCEDURE — 3051F HG A1C>EQUAL 7.0%<8.0%: CPT | Mod: CPTII,S$GLB,, | Performed by: INTERNAL MEDICINE

## 2020-03-02 PROCEDURE — 3008F BODY MASS INDEX DOCD: CPT | Mod: CPTII,S$GLB,, | Performed by: INTERNAL MEDICINE

## 2020-03-02 PROCEDURE — 99213 PR OFFICE/OUTPT VISIT, EST, LEVL III, 20-29 MIN: ICD-10-PCS | Mod: 25,S$GLB,, | Performed by: INTERNAL MEDICINE

## 2020-03-02 PROCEDURE — 3074F PR MOST RECENT SYSTOLIC BLOOD PRESSURE < 130 MM HG: ICD-10-PCS | Mod: CPTII,S$GLB,, | Performed by: INTERNAL MEDICINE

## 2020-03-02 PROCEDURE — 99213 OFFICE O/P EST LOW 20 MIN: CPT | Mod: 25,S$GLB,, | Performed by: INTERNAL MEDICINE

## 2020-03-02 PROCEDURE — 3051F PR MOST RECENT HEMOGLOBIN A1C LEVEL 7.0 - < 8.0%: ICD-10-PCS | Mod: CPTII,S$GLB,, | Performed by: INTERNAL MEDICINE

## 2020-03-02 PROCEDURE — 90732 PNEUMOCOCCAL POLYSACCHARIDE VACCINE 23-VALENT =>2YO SQ IM: ICD-10-PCS | Mod: S$GLB,,, | Performed by: INTERNAL MEDICINE

## 2020-03-02 PROCEDURE — 90471 PNEUMOCOCCAL POLYSACCHARIDE VACCINE 23-VALENT =>2YO SQ IM: ICD-10-PCS | Mod: S$GLB,,, | Performed by: INTERNAL MEDICINE

## 2020-03-02 PROCEDURE — 3078F PR MOST RECENT DIASTOLIC BLOOD PRESSURE < 80 MM HG: ICD-10-PCS | Mod: CPTII,S$GLB,, | Performed by: INTERNAL MEDICINE

## 2020-03-02 PROCEDURE — 99999 PR PBB SHADOW E&M-EST. PATIENT-LVL IV: CPT | Mod: PBBFAC,,, | Performed by: INTERNAL MEDICINE

## 2020-03-02 PROCEDURE — 90732 PPSV23 VACC 2 YRS+ SUBQ/IM: CPT | Mod: S$GLB,,, | Performed by: INTERNAL MEDICINE

## 2020-03-02 PROCEDURE — 99999 PR PBB SHADOW E&M-EST. PATIENT-LVL IV: ICD-10-PCS | Mod: PBBFAC,,, | Performed by: INTERNAL MEDICINE

## 2020-03-02 PROCEDURE — 3074F SYST BP LT 130 MM HG: CPT | Mod: CPTII,S$GLB,, | Performed by: INTERNAL MEDICINE

## 2020-03-02 PROCEDURE — 90471 IMMUNIZATION ADMIN: CPT | Mod: S$GLB,,, | Performed by: INTERNAL MEDICINE

## 2020-03-02 RX ORDER — CLINDAMYCIN HYDROCHLORIDE 300 MG/1
CAPSULE ORAL
COMMUNITY
Start: 2020-01-16 | End: 2020-03-02

## 2020-03-02 RX ORDER — PROMETHAZINE HYDROCHLORIDE 25 MG/1
TABLET ORAL
COMMUNITY
Start: 2020-01-23 | End: 2020-03-02

## 2020-03-02 RX ORDER — LANCETS 33 GAUGE
EACH MISCELLANEOUS
COMMUNITY
Start: 2019-12-13 | End: 2020-09-21 | Stop reason: SDUPTHER

## 2020-03-02 RX ORDER — ONDANSETRON 4 MG/1
TABLET, ORALLY DISINTEGRATING ORAL
COMMUNITY
Start: 2020-01-16 | End: 2020-03-02

## 2020-03-02 RX ORDER — SODIUM, POTASSIUM,MAG SULFATES 17.5-3.13G
SOLUTION, RECONSTITUTED, ORAL ORAL
COMMUNITY
Start: 2019-12-16 | End: 2020-03-02

## 2020-03-02 RX ORDER — CHLORHEXIDINE GLUCONATE ORAL RINSE 1.2 MG/ML
SOLUTION DENTAL
COMMUNITY
Start: 2020-01-16 | End: 2020-03-02

## 2020-03-02 NOTE — PROGRESS NOTES
Two patient identifiers used, allergies reviewed, vaccine confirmed.  Pneumovax/23 pneumococcal polysaccharide vaccine administered IM left deltoid.  Pt tolerated well, no redness or bruising at injection site.  Pt advised to remain in clinic 15 mins following injection for observation.

## 2020-03-02 NOTE — PROGRESS NOTES
[facility] Note    Chief Complaint      Chief Complaint   Patient presents with    Follow-up       History of Present Illness      Cristhian Gregory is a 59 y.o. male with chronic conditions of DM2, HTN, squamous cell carcinoma left tonsil who presents today for: follow up chronic conditions.  Since last visit, had colonoscopy and oral surgery and noticed pain from surgery and change in diet increased blood sugars for a period of time.  Pain is much better at this time.     DM2: A1C 7.2, up from 6.3. Likely due to interval oral surgery and change in diet. On metformin.  On lipitor for statin indication with diabetes. Otherwise doing well.  Eye exam UTD with Dr. Villa.  HTN: BP at goal on losartan, hctz.    Squamous cell carcinoma, left tonsil: Surveillance has been doing well.    Flu shot UTD.  TdAP 2019.  Shingles vaccine due age 62.  Pneumovax due.  Prevnar due age 67.    Cscope, Dr. Tejada, 2020, polyp, 5 yr interval.     Past Medical History:  Past Medical History:   Diagnosis Date    Diabetes mellitus, type 2     Hypertension        Past Surgical History:   has no past surgical history on file.    Family History:  family history includes Cancer in his mother; Diabetes in his mother; Heart disease in his father; No Known Problems in his brother and sister.     Social History:  Social History     Tobacco Use    Smoking status: Never Smoker   Substance Use Topics    Alcohol use: Never     Frequency: Never    Drug use: Never       Review of Systems   Constitutional: Positive for weight loss. Negative for chills, fever and malaise/fatigue.   Eyes: Negative for blurred vision.   Respiratory: Negative for shortness of breath.    Cardiovascular: Negative for chest pain.   Gastrointestinal: Negative for constipation, diarrhea, nausea and vomiting.   Skin: Negative for rash.   Neurological: Negative for dizziness, tremors, seizures, weakness and headaches.   Endo/Heme/Allergies: Negative for  polydipsia.   Psychiatric/Behavioral: The patient is not nervous/anxious.         Medications:  Outpatient Encounter Medications as of 3/2/2020   Medication Sig Dispense Refill    atorvastatin (LIPITOR) 10 MG tablet Take 1 tablet (10 mg total) by mouth once daily. 90 tablet 3    blood sugar diagnostic Strp 1 each by Misc.(Non-Drug; Combo Route) route once daily. 100 each 11    blood-glucose meter kit Use as instructed 1 each 0    chlorhexidine (PERIDEX) 0.12 % solution RINSE AND SPIT OUT 10 ML PO TID      clindamycin (CLEOCIN) 300 MG capsule       hydroCHLOROthiazide (HYDRODIURIL) 12.5 MG Tab Take 1 tablet (12.5 mg total) by mouth once daily. 1 TAB PO QD 30 tablet 5    lancets Misc 1 each by Misc.(Non-Drug; Combo Route) route once daily. 100 each 11    losartan (COZAAR) 100 MG tablet Take 1 tablet (100 mg total) by mouth once daily. 1 tab po qd 30 tablet 5    metFORMIN (GLUCOPHAGE) 500 MG tablet Take 1 tablet (500 mg total) by mouth 2 (two) times daily with meals. 1 tab BID 60 tablet 5    TRUEPLUS LANCETS 33 gauge Misc USE TO TEST ONCE D      HYDROcodone-acetaminophen (NORCO) 7.5-325 mg per tablet Take 1 tablet by mouth every 4 (four) hours as needed. for pain.  0    ibuprofen (ADVIL,MOTRIN) 600 MG tablet Take 600 mg by mouth every 6 (six) hours as needed.  0    ondansetron (ZOFRAN-ODT) 4 MG TbDL       promethazine (PHENERGAN) 25 MG tablet       SUPREP BOWEL PREP KIT 17.5-3.13-1.6 gram SolR MX AND DRK UTD       No facility-administered encounter medications on file as of 3/2/2020.        Allergies:  Review of patient's allergies indicates:   Allergen Reactions    Ampicillin        Health Maintenance:  Immunization History   Administered Date(s) Administered    Influenza - Quadrivalent - PF (6 months and older) 11/25/2019    Tdap 11/25/2019      Health Maintenance   Topic Date Due    Pneumococcal Vaccine (Highest Risk) (1 of 3 - PCV13) 12/19/1981    Hemoglobin A1c  05/28/2020    Eye Exam   "06/26/2020    Foot Exam  11/25/2020    Lipid Panel  11/25/2020    Low Dose Statin  03/02/2021    Colonoscopy  01/09/2025    TETANUS VACCINE  11/25/2029    Hepatitis C Screening  Completed        Physical Exam      Vital Signs  Temp: 98 °F (36.7 °C)  Pulse: 86  Resp: 18  SpO2: 98 %  BP: 122/65  BP Location: Left arm  Patient Position: Sitting  Pain Score: 0-No pain  Height and Weight  Height: 5' 10" (177.8 cm)  Weight: 92.8 kg (204 lb 9.4 oz)  BSA (Calculated - sq m): 2.14 sq meters  BMI (Calculated): 29.4  Weight in (lb) to have BMI = 25: 173.9]    Physical Exam   Constitutional: He appears well-developed and well-nourished.   HENT:   Head: Normocephalic and atraumatic.   Right Ear: External ear normal.   Left Ear: External ear normal.   Mouth/Throat: Oropharynx is clear and moist.   Eyes: Pupils are equal, round, and reactive to light. Conjunctivae and EOM are normal.   Cardiovascular: Normal rate, regular rhythm, normal heart sounds and intact distal pulses.   No murmur heard.  Pulmonary/Chest: Effort normal and breath sounds normal. He has no wheezes. He has no rales.   Abdominal: Soft. Bowel sounds are normal. He exhibits no distension and no abdominal bruit. There is no hepatosplenomegaly. There is no tenderness.   Vitals reviewed.       Laboratory:  CBC:  Recent Labs   Lab 08/07/19 0752   WBC 5.70   RBC 5.25   Hemoglobin 14.9   Hematocrit 44.8   Platelets 242   Mean Corpuscular Volume 85   Mean Corpuscular Hemoglobin 28.4   Mean Corpuscular Hemoglobin Conc 33.3     CMP:  Recent Labs   Lab 08/07/19  0752 02/28/20  0703   Glucose 149 H 155 H   Calcium 9.7 9.4   Albumin 4.1  --    Total Protein 7.7  --    Sodium 138 143   Potassium 4.0 3.8   CO2 26 29   Chloride 101 106   BUN, Bld 22 H 20   Alkaline Phosphatase 55  --    ALT 26  --    AST 21  --    Total Bilirubin 0.5  --      URINALYSIS:       LIPIDS:  Recent Labs   Lab 08/07/19  0752 11/25/19  0855   TSH 2.054  --    HDL  --  37 L   Cholesterol  --  204 " H   Triglycerides  --  231 H   LDL Cholesterol  --  120.8   Hdl/Cholesterol Ratio  --  18.1 L   Non-HDL Cholesterol  --  167   Total Cholesterol/HDL Ratio  --  5.5 H     TSH:  Recent Labs   Lab 08/07/19  0752   TSH 2.054     A1C:  Recent Labs   Lab 08/07/19  0752 11/25/19  0855 02/28/20  0703   Hemoglobin A1C 8.0 H 6.3 H 7.2 H       Assessment/Plan     Cristhian Gregory is a 59 y.o.male with:    1. Type 2 diabetes mellitus without complication, without long-term current use of insulin  Continue current meds.  Reviewed results with pt.  Will continue diet and recehck in 6 months.    2. Essential hypertension  Continue current meds.    3. Squamous cell carcinoma of left tonsil  Cont surveillance exams with ENT.    Chronic conditions status updated as per HPI.  Other than changes above, cont current medications and maintain follow up with specialists.  Return to clinic in 6 months labs prior.    Bethel Echols MD  [facility]                Answers for HPI/ROS submitted by the patient on 2/26/2020   Diabetes problem  Diabetes type: type 2  MedicAlert ID: No  Disease duration: 6 months  fatigue: Yes  foot paresthesias: No  foot ulcerations: No  polyphagia: No  polyuria: No  visual change: No  Symptom course: improving  confusion: No  hunger: No  mood changes: No  pallor: No  sleepiness: No  speech difficulty: No  sweats: No  blackouts: No  hospitalization: No  nocturnal hypoglycemia: No  required assistance: No  required glucagon: No  CVA: No  heart disease: No  impotence: No  nephropathy: No  peripheral neuropathy: No  PVD: No  retinopathy: No  autonomic neuropathy: No  CAD risks: family history, hypertension, diabetes mellitus, male sex  Current treatments: diet, oral agent (monotherapy)  Treatment compliance: all of the time  Dose schedule: pre-breakfast  Given by: patient  Home blood tests: 1-2 x per day  Monitoring compliance: excellent  Blood glucose trend: decreasing steadily  Weight trend: decreasing  steadily  Current diet: diabetic, generally healthy, low fat/cholesterol, low salt  Meal planning: ADA exchanges, avoidance of concentrated sweets, carbohydrate counting  Exercise: three times a week  Dietitian visit: No  Eye exam current: Yes  Sees podiatrist: Yes     PAIN SCALE 2 OF 10.

## 2020-04-17 RX ORDER — LOSARTAN POTASSIUM 100 MG/1
100 TABLET ORAL DAILY
Qty: 90 TABLET | Refills: 3 | Status: SHIPPED | OUTPATIENT
Start: 2020-04-17 | End: 2021-04-06 | Stop reason: SDUPTHER

## 2020-05-01 RX ORDER — HYDROCHLOROTHIAZIDE 12.5 MG/1
12.5 TABLET ORAL DAILY
Qty: 90 TABLET | Refills: 3 | Status: SHIPPED | OUTPATIENT
Start: 2020-05-01 | End: 2021-04-26 | Stop reason: SDUPTHER

## 2020-06-19 RX ORDER — METFORMIN HYDROCHLORIDE 500 MG/1
500 TABLET ORAL 2 TIMES DAILY WITH MEALS
Qty: 180 TABLET | Refills: 0 | Status: SHIPPED | OUTPATIENT
Start: 2020-06-19 | End: 2020-10-06 | Stop reason: SDUPTHER

## 2020-08-21 DIAGNOSIS — E11.9 TYPE 2 DIABETES MELLITUS WITHOUT COMPLICATION, UNSPECIFIED WHETHER LONG TERM INSULIN USE: ICD-10-CM

## 2020-09-04 ENCOUNTER — LAB VISIT (OUTPATIENT)
Dept: LAB | Facility: HOSPITAL | Age: 60
End: 2020-09-04
Attending: INTERNAL MEDICINE
Payer: COMMERCIAL

## 2020-09-04 DIAGNOSIS — I10 ESSENTIAL HYPERTENSION: ICD-10-CM

## 2020-09-04 DIAGNOSIS — Z00.00 ANNUAL PHYSICAL EXAM: ICD-10-CM

## 2020-09-04 DIAGNOSIS — E11.9 TYPE 2 DIABETES MELLITUS WITHOUT COMPLICATION, WITHOUT LONG-TERM CURRENT USE OF INSULIN: ICD-10-CM

## 2020-09-04 DIAGNOSIS — C09.9 SQUAMOUS CELL CARCINOMA OF LEFT TONSIL: ICD-10-CM

## 2020-09-04 PROCEDURE — 82043 UR ALBUMIN QUANTITATIVE: CPT

## 2020-09-05 LAB
ALBUMIN/CREAT UR: NORMAL UG/MG (ref 0–30)
CREAT UR-MCNC: 66 MG/DL (ref 23–375)
MICROALBUMIN UR DL<=1MG/L-MCNC: <2.5 UG/ML

## 2020-09-11 ENCOUNTER — TELEPHONE (OUTPATIENT)
Dept: ADMINISTRATIVE | Facility: HOSPITAL | Age: 60
End: 2020-09-11

## 2020-09-11 ENCOUNTER — OFFICE VISIT (OUTPATIENT)
Dept: PRIMARY CARE CLINIC | Facility: CLINIC | Age: 60
End: 2020-09-11
Payer: COMMERCIAL

## 2020-09-11 VITALS
HEIGHT: 70 IN | BODY MASS INDEX: 28.85 KG/M2 | DIASTOLIC BLOOD PRESSURE: 80 MMHG | HEART RATE: 72 BPM | WEIGHT: 201.5 LBS | SYSTOLIC BLOOD PRESSURE: 132 MMHG

## 2020-09-11 DIAGNOSIS — I10 ESSENTIAL HYPERTENSION: Primary | ICD-10-CM

## 2020-09-11 DIAGNOSIS — Z00.00 ANNUAL PHYSICAL EXAM: ICD-10-CM

## 2020-09-11 DIAGNOSIS — K64.9 HEMORRHOIDS, UNSPECIFIED HEMORRHOID TYPE: ICD-10-CM

## 2020-09-11 DIAGNOSIS — E11.9 TYPE 2 DIABETES MELLITUS WITHOUT COMPLICATION, WITHOUT LONG-TERM CURRENT USE OF INSULIN: ICD-10-CM

## 2020-09-11 DIAGNOSIS — C09.9 SQUAMOUS CELL CARCINOMA OF LEFT TONSIL: ICD-10-CM

## 2020-09-11 DIAGNOSIS — Z23 NEED FOR PROPHYLACTIC VACCINATION AND INOCULATION AGAINST INFLUENZA: ICD-10-CM

## 2020-09-11 DIAGNOSIS — Z11.4 SCREENING FOR HIV (HUMAN IMMUNODEFICIENCY VIRUS): ICD-10-CM

## 2020-09-11 PROCEDURE — 99396 PR PREVENTIVE VISIT,EST,40-64: ICD-10-PCS | Mod: 25,S$GLB,, | Performed by: INTERNAL MEDICINE

## 2020-09-11 PROCEDURE — 99999 PR PBB SHADOW E&M-EST. PATIENT-LVL III: CPT | Mod: PBBFAC,,, | Performed by: INTERNAL MEDICINE

## 2020-09-11 PROCEDURE — 3044F PR MOST RECENT HEMOGLOBIN A1C LEVEL <7.0%: ICD-10-PCS | Mod: CPTII,S$GLB,, | Performed by: INTERNAL MEDICINE

## 2020-09-11 PROCEDURE — 90686 IIV4 VACC NO PRSV 0.5 ML IM: CPT | Mod: S$GLB,,, | Performed by: INTERNAL MEDICINE

## 2020-09-11 PROCEDURE — 90686 FLU VACCINE (QUAD) GREATER THAN OR EQUAL TO 3YO PRESERVATIVE FREE IM: ICD-10-PCS | Mod: S$GLB,,, | Performed by: INTERNAL MEDICINE

## 2020-09-11 PROCEDURE — 3044F HG A1C LEVEL LT 7.0%: CPT | Mod: CPTII,S$GLB,, | Performed by: INTERNAL MEDICINE

## 2020-09-11 PROCEDURE — 3080F PR MOST RECENT DIASTOLIC BLOOD PRESSURE >= 90 MM HG: ICD-10-PCS | Mod: CPTII,S$GLB,, | Performed by: INTERNAL MEDICINE

## 2020-09-11 PROCEDURE — 3008F BODY MASS INDEX DOCD: CPT | Mod: CPTII,S$GLB,, | Performed by: INTERNAL MEDICINE

## 2020-09-11 PROCEDURE — 3077F PR MOST RECENT SYSTOLIC BLOOD PRESSURE >= 140 MM HG: ICD-10-PCS | Mod: CPTII,S$GLB,, | Performed by: INTERNAL MEDICINE

## 2020-09-11 PROCEDURE — 3080F DIAST BP >= 90 MM HG: CPT | Mod: CPTII,S$GLB,, | Performed by: INTERNAL MEDICINE

## 2020-09-11 PROCEDURE — 3008F PR BODY MASS INDEX (BMI) DOCUMENTED: ICD-10-PCS | Mod: CPTII,S$GLB,, | Performed by: INTERNAL MEDICINE

## 2020-09-11 PROCEDURE — 99396 PREV VISIT EST AGE 40-64: CPT | Mod: 25,S$GLB,, | Performed by: INTERNAL MEDICINE

## 2020-09-11 PROCEDURE — 90471 FLU VACCINE (QUAD) GREATER THAN OR EQUAL TO 3YO PRESERVATIVE FREE IM: ICD-10-PCS | Mod: S$GLB,,, | Performed by: INTERNAL MEDICINE

## 2020-09-11 PROCEDURE — 99999 PR PBB SHADOW E&M-EST. PATIENT-LVL III: ICD-10-PCS | Mod: PBBFAC,,, | Performed by: INTERNAL MEDICINE

## 2020-09-11 PROCEDURE — 3077F SYST BP >= 140 MM HG: CPT | Mod: CPTII,S$GLB,, | Performed by: INTERNAL MEDICINE

## 2020-09-11 PROCEDURE — 90471 IMMUNIZATION ADMIN: CPT | Mod: S$GLB,,, | Performed by: INTERNAL MEDICINE

## 2020-09-11 RX ORDER — HYDROCORTISONE 25 MG/G
CREAM TOPICAL 2 TIMES DAILY
Qty: 20 G | Refills: 3 | Status: SHIPPED | OUTPATIENT
Start: 2020-09-11

## 2020-09-11 NOTE — PROGRESS NOTES
Two patient identifiers verified.  Allergies reviewed.  Flu 0.5 ml IM administered to Left deltoid per MD order.  Patient tolerated injection well; no redness, bleeding, or bruising noted to injection site.  Patient instructed to remain in clinic setting for 15 minutes.  Verbalizes understanding.

## 2020-09-11 NOTE — PROGRESS NOTES
[facility] Note    Chief Complaint      Chief Complaint   Patient presents with    Hypertension    Diabetes    Hemorrhoids       History of Present Illness      Cristhian Gregory is a 59 y.o. male with chronic conditions of HTN,  DM2, squamous cell carcinoma left tonsil who presents today for: annual preventative visit.  Complains of hemorrhoids.  Present for the past few weeks.  Has been using Tuck's pads and preparation H.  DM2: A1C 6.8. Likely due to interval oral surgery and change in diet. On metformin.  On lipitor for statin indication with diabetes. Otherwise doing well.  Eye exam UTD with Dr. Villa.  HTN: BP at goal on losartan, hctz.    Squamous cell carcinoma, left tonsil: Surveillance has been doing well.    Diet: Prepares own food mostly.  LImiting fatty foods and carbs.  Drinks plenty water  Exercise: Stays active and walks regularly.  Denies drinking and driving, drinking more than 4 drinks on occasion, drug use.     Flu shot due.  TdAP 2019.  Shingles vaccine due age 62.  Pneumovax UTD.  Prevnar due age 67.    Cscope, Dr. Tejada, 2020, polyp, 5 yr interval.     Past Medical History:  Past Medical History:   Diagnosis Date    Diabetes mellitus, type 2     Hypertension        Past Surgical History:   has no past surgical history on file.    Family History:  family history includes Cancer in his mother; Diabetes in his mother; Heart disease in his father; No Known Problems in his brother and sister.     Social History:  Social History     Tobacco Use    Smoking status: Never Smoker   Substance Use Topics    Alcohol use: Never     Frequency: Never    Drug use: Never       I personally reviewed all past medical, surgical, social and family history.    Review of Systems   Constitutional: Negative for chills, fever and malaise/fatigue.   Respiratory: Negative for shortness of breath.    Cardiovascular: Negative for chest pain.   Gastrointestinal: Negative for constipation, diarrhea,  "nausea and vomiting.   Skin: Negative for rash.   Neurological: Negative for weakness.   All other systems reviewed and are negative.       Medications:  Outpatient Encounter Medications as of 9/11/2020   Medication Sig Dispense Refill    atorvastatin (LIPITOR) 10 MG tablet Take 1 tablet (10 mg total) by mouth once daily. 90 tablet 3    blood sugar diagnostic Strp 1 each by Misc.(Non-Drug; Combo Route) route once daily. 100 each 11    blood-glucose meter kit Use as instructed 1 each 0    hydroCHLOROthiazide (HYDRODIURIL) 12.5 MG Tab Take 1 tablet (12.5 mg total) by mouth once daily. 1 TAB PO QD 90 tablet 3    lancets Misc 1 each by Misc.(Non-Drug; Combo Route) route once daily. 100 each 11    losartan (COZAAR) 100 MG tablet Take 1 tablet (100 mg total) by mouth once daily. 90 tablet 3    metFORMIN (GLUCOPHAGE) 500 MG tablet Take 1 tablet (500 mg total) by mouth 2 (two) times daily with meals. 1 tab  tablet 0    TRUEPLUS LANCETS 33 gauge Misc USE TO TEST ONCE D      hydrocortisone 2.5 % cream Apply topically 2 (two) times daily. 20 g 3     No facility-administered encounter medications on file as of 9/11/2020.        Allergies:  Review of patient's allergies indicates:   Allergen Reactions    Ampicillin        Health Maintenance:  Immunization History   Administered Date(s) Administered    Influenza - Quadrivalent - PF *Preferred* (6 months and older) 11/25/2019    Pneumococcal Polysaccharide - 23 Valent 03/02/2020    Tdap 11/25/2019      Health Maintenance   Topic Date Due    Eye Exam  06/26/2020    Foot Exam  11/25/2020    Hemoglobin A1c  12/04/2020    Pneumococcal Vaccine (Highest Risk) (2 of 3 - PCV13) 03/02/2021    Lipid Panel  09/04/2021    Low Dose Statin  09/11/2021    TETANUS VACCINE  11/25/2029    Hepatitis C Screening  Completed        Physical Exam      Vital Signs  Pulse: 72  BP: 132/80  BP Location: Left arm  Pain Score:   7  Height and Weight  Height: 5' 10" (177.8 " cm)  Weight: 91.4 kg (201 lb 8 oz)  BSA (Calculated - sq m): 2.12 sq meters  BMI (Calculated): 28.9  Weight in (lb) to have BMI = 25: 173.9]    Physical Exam  Vitals signs reviewed.   Constitutional:       Appearance: He is well-developed.   HENT:      Head: Normocephalic and atraumatic.      Right Ear: External ear normal.      Left Ear: External ear normal.   Eyes:      Conjunctiva/sclera: Conjunctivae normal.      Pupils: Pupils are equal, round, and reactive to light.   Cardiovascular:      Rate and Rhythm: Normal rate and regular rhythm.      Heart sounds: Normal heart sounds. No murmur.   Pulmonary:      Effort: Pulmonary effort is normal.      Breath sounds: Normal breath sounds. No wheezing or rales.   Abdominal:      General: Bowel sounds are normal. There is no distension or abdominal bruit.      Palpations: Abdomen is soft.      Tenderness: There is no abdominal tenderness.          Laboratory:  CBC:  Recent Labs   Lab 08/07/19 0752 09/04/20  0704   WBC 5.70 4.29   RBC 5.25 4.94   Hemoglobin 14.9 14.0   Hematocrit 44.8 44.8   Platelets 242 205   Mean Corpuscular Volume 85 91   Mean Corpuscular Hemoglobin 28.4 28.3   Mean Corpuscular Hemoglobin Conc 33.3 31.3 L     CMP:  Recent Labs   Lab 08/07/19  0752 09/04/20  0704   Glucose 149 H   < > 127 H   Calcium 9.7   < > 9.6   Albumin 4.1  --  4.3   Total Protein 7.7  --  7.4   Sodium 138   < > 142   Potassium 4.0   < > 3.8   CO2 26   < > 28   Chloride 101   < > 106   BUN, Bld 22 H   < > 20   Alkaline Phosphatase 55  --  61   ALT 26  --  20   AST 21  --  19   Total Bilirubin 0.5  --  0.4    < > = values in this interval not displayed.     URINALYSIS:       LIPIDS:  Recent Labs   Lab 08/07/19 0752 11/25/19  0855 09/04/20  0704   TSH 2.054  --  2.557   HDL  --  37 L 37 L   Cholesterol  --  204 H 166   Triglycerides  --  231 H 206 H   LDL Cholesterol  --  120.8 87.8   Hdl/Cholesterol Ratio  --  18.1 L 22.3   Non-HDL Cholesterol  --  167 129   Total  Cholesterol/HDL Ratio  --  5.5 H 4.5     TSH:  Recent Labs   Lab 08/07/19  0752 09/04/20  0704   TSH 2.054 2.557     A1C:  Recent Labs   Lab 08/07/19  0752 11/25/19  0855 02/28/20  0703 09/04/20  0704   Hemoglobin A1C 8.0 H 6.3 H 7.2 H 6.8 H       Assessment/Plan     Cristhian Gregory is a 59 y.o.male with:    Annual preventative visit: Discussed diet and exercise, vaccines and cancer screening, risk factors.  Screening labs reviewed.       1. Essential hypertension  - Comprehensive metabolic panel; Future  Continue current meds.    2. Type 2 diabetes mellitus without complication, without long-term current use of insulin  - Comprehensive metabolic panel; Future  - Hemoglobin A1C; Future  Continue current meds.  A1C at goal.  Eye exam requesting  3. Squamous cell carcinoma of left tonsil  F/U doing well.  4. Screening for HIV (human immunodeficiency virus)  - HIV 1/2 Ag/Ab (4th Gen); Future    5. Hemorrhoids, unspecified hemorrhoid type  - hydrocortisone 2.5 % cream; Apply topically 2 (two) times daily.  Dispense: 20 g; Refill: 3  Will start hydrocortisone cream.    Chronic conditions status updated as per HPI.  Other than changes above, cont current medications and maintain follow up with specialists.  Return to clinic in 6 months.    Bethel Echols MD  [facility]

## 2020-09-21 DIAGNOSIS — E11.65 UNCONTROLLED TYPE 2 DIABETES MELLITUS WITH HYPERGLYCEMIA: ICD-10-CM

## 2020-09-21 RX ORDER — LANCETS
1 EACH MISCELLANEOUS DAILY
Qty: 100 EACH | Refills: 11 | Status: SHIPPED | OUTPATIENT
Start: 2020-09-21

## 2020-09-21 RX ORDER — CALCIUM CITRATE/VITAMIN D3 200MG-6.25
1 TABLET ORAL 2 TIMES DAILY
Qty: 200 EACH | Refills: 3 | Status: SHIPPED | OUTPATIENT
Start: 2020-09-21

## 2020-09-21 NOTE — TELEPHONE ENCOUNTER
No new care gaps identified.  Powered by [employer]. Reference number: [id number]. 9/21/2020 9:42:03 AM SHAHANAT

## 2020-09-21 NOTE — PROGRESS NOTES
Refill Authorization Note   Cristhian Gregory is requesting a refill authorization.     Brief assessment and rationale for refill: APPROVE: prr          Medication Therapy Plan: [facility]    Medication reconciliation completed: No                    Comments:      Orders Placed This Encounter    blood sugar diagnostic (TRUE METRIX GLUCOSE TEST STRIP) Strp    lancets Misc      Requested Prescriptions   Signed Prescriptions Disp Refills    blood sugar diagnostic (TRUE METRIX GLUCOSE TEST STRIP) Strp 200 each 3     Si strip by Misc.(Non-Drug; Combo Route) route 2 (two) times a day.       There is no refill protocol information for this order       lancets Misc 100 each 11     Si each by Misc.(Non-Drug; Combo Route) route once daily.       There is no refill protocol information for this order         Lab Results   Component Value Date    HGBA1C 6.8 (H) 2020      Appointments  past 12m or future 3m with PCP    Date Provider   Last Visit   2020 Bethel Echols MD   Next Visit   3/12/2021 Bethel Echols MD   ED visits in past 90 days: 0     Note composed:2:32 PM 2020

## 2020-10-05 ENCOUNTER — PATIENT MESSAGE (OUTPATIENT)
Dept: INTERNAL MEDICINE | Facility: CLINIC | Age: 60
End: 2020-10-05

## 2020-10-06 RX ORDER — METFORMIN HYDROCHLORIDE 500 MG/1
500 TABLET ORAL 2 TIMES DAILY WITH MEALS
Qty: 180 TABLET | Refills: 3 | Status: SHIPPED | OUTPATIENT
Start: 2020-10-06 | End: 2021-05-17 | Stop reason: SDUPTHER

## 2020-11-18 DIAGNOSIS — E11.9 TYPE 2 DIABETES MELLITUS WITHOUT COMPLICATION, WITHOUT LONG-TERM CURRENT USE OF INSULIN: ICD-10-CM

## 2020-11-18 RX ORDER — ATORVASTATIN CALCIUM 10 MG/1
10 TABLET, FILM COATED ORAL DAILY
Qty: 90 TABLET | Refills: 3 | Status: SHIPPED | OUTPATIENT
Start: 2020-11-18 | End: 2021-05-17 | Stop reason: SDUPTHER

## 2020-11-18 NOTE — TELEPHONE ENCOUNTER
No new care gaps identified.  Powered by [facility]. Reference number: [id number]. 11/18/2020 3:43:38 PM   CST

## 2020-11-18 NOTE — PROGRESS NOTES
Refill Authorization Note   Cristhian Gregory is requesting a refill authorization.  Brief assessment and rationale for refill: Approve     Medication Therapy Plan: [facility]    Medication reconciliation completed: No   Comments:   Orders Placed This Encounter    atorvastatin (LIPITOR) 10 MG tablet      Requested Prescriptions   Signed Prescriptions Disp Refills    atorvastatin (LIPITOR) 10 MG tablet 90 tablet 3     Sig: Take 1 tablet (10 mg total) by mouth once daily.       Cardiovascular:  Antilipid - Statins Passed - 11/18/2020  3:43 PM        Passed - Patient is at least 18 years old        Passed - Office visit in past 12 months or future 90 days     Recent Outpatient Visits            2 months ago Essential hypertension    [facility] Bethel Echols MD    8 months ago Essential hypertension    [facility] Bethel Echols MD    11 months ago Dental caries    [facility] Bethel Echols MD    1 year ago Essential hypertension    [facility] Bethel Echols MD          Future Appointments              In 3 months LAB, [facility], [facility]    In 3 months Bethel Echols MD [place] [facility] [place]                Passed - ALT is 94 or below and within 360 days     ALT   Date Value Ref Range Status   09/04/2020 20 10 - 44 U/L Final   08/07/2019 26 10 - 44 U/L Final              Passed - AST is 54 or below and within 360 days     AST   Date Value Ref Range Status   09/04/2020 19 10 - 40 U/L Final   08/07/2019 21 10 - 40 U/L Final              Passed - Total Cholesterol within 360 days     Cholesterol   Date Value Ref Range Status   09/04/2020 166 120 - 199 mg/dL Final     Comment:     The National Cholesterol Education Program (NCEP) has set the  following guidelines (reference ranges) for Cholesterol:  Optimal.....................<200 mg/dL  Borderline High.............200-239  mg/dL  High........................> or = 240 mg/dL     11/25/2019 204 (H) 120 - 199 mg/dL Final     Comment:     The National Cholesterol Education Program (NCEP) has set the  following guidelines (reference ranges) for Cholesterol:  Optimal.....................<200 mg/dL  Borderline High.............200-239 mg/dL  High........................> or = 240 mg/dL                Passed - LDL within 360 days     LDL Cholesterol   Date Value Ref Range Status   09/04/2020 87.8 63.0 - 159.0 mg/dL Final     Comment:     The National Cholesterol Education Program (NCEP) has set the  following guidelines (reference values) for LDL Cholesterol:  Optimal.......................<130 mg/dL  Borderline High...............130-159 mg/dL  High..........................160-189 mg/dL  Very High.....................>190 mg/dL              Passed - HDL within 360 days     HDL   Date Value Ref Range Status   09/04/2020 37 (L) 40 - 75 mg/dL Final     Comment:     The National Cholesterol Education Program (NCEP) has set the  following guidelines (reference values) for HDL Cholesterol:  Low...............<40 mg/dL  Optimal...........>60 mg/dL              Passed - Triglycerides within 360 days     Triglycerides   Date Value Ref Range Status   09/04/2020 206 (H) 30 - 150 mg/dL Final     Comment:     The National Cholesterol Education Program (NCEP) has set the  following guidelines (reference values) for triglycerides:  Normal......................<150 mg/dL  Borderline High.............150-199 mg/dL  High........................200-499 mg/dL     11/25/2019 231 (H) 30 - 150 mg/dL Final     Comment:     The National Cholesterol Education Program (NCEP) has set the  following guidelines (reference values) for triglycerides:  Normal......................<150 mg/dL  Borderline High.............150-199 mg/dL  High........................200-499 mg/dL                    Appointments  past 12m or future 3m with PCP    Date Provider   Last Visit    9/11/2020 Behtel Echols MD   Next Visit   3/12/2021 Bethel Echols MD   ED visits in past 90 days: 0     Note composed:3:54 PM 11/18/2020

## 2021-01-04 ENCOUNTER — PATIENT MESSAGE (OUTPATIENT)
Dept: ADMINISTRATIVE | Facility: HOSPITAL | Age: 61
End: 2021-01-04

## 2021-02-22 ENCOUNTER — PATIENT MESSAGE (OUTPATIENT)
Dept: ADMINISTRATIVE | Facility: CLINIC | Age: 61
End: 2021-02-22

## 2021-02-27 ENCOUNTER — IMMUNIZATION (OUTPATIENT)
Dept: HEMATOLOGY/ONCOLOGY | Facility: CLINIC | Age: 61
End: 2021-02-27
Payer: COMMERCIAL

## 2021-02-27 DIAGNOSIS — Z23 NEED FOR VACCINATION: Primary | ICD-10-CM

## 2021-02-27 PROCEDURE — 0011A COVID-19, MRNA, LNP-S, PF, 100 MCG/0.5 ML DOSE VACCINE: ICD-10-PCS | Mod: S$GLB,,, | Performed by: FAMILY MEDICINE

## 2021-02-27 PROCEDURE — 0011A COVID-19, MRNA, LNP-S, PF, 100 MCG/0.5 ML DOSE VACCINE: CPT | Mod: S$GLB,,, | Performed by: FAMILY MEDICINE

## 2021-02-27 PROCEDURE — 91301 COVID-19, MRNA, LNP-S, PF, 100 MCG/0.5 ML DOSE VACCINE: ICD-10-PCS | Mod: S$GLB,,, | Performed by: FAMILY MEDICINE

## 2021-02-27 PROCEDURE — 91301 COVID-19, MRNA, LNP-S, PF, 100 MCG/0.5 ML DOSE VACCINE: CPT | Mod: S$GLB,,, | Performed by: FAMILY MEDICINE

## 2021-03-27 ENCOUNTER — IMMUNIZATION (OUTPATIENT)
Dept: HEMATOLOGY/ONCOLOGY | Facility: CLINIC | Age: 61
End: 2021-03-27

## 2021-03-27 DIAGNOSIS — Z23 NEED FOR VACCINATION: Primary | ICD-10-CM

## 2021-03-27 PROCEDURE — 0012A COVID-19, MRNA, LNP-S, PF, 100 MCG/0.5 ML DOSE VACCINE: ICD-10-PCS | Mod: CV19,S$GLB,, | Performed by: FAMILY MEDICINE

## 2021-03-27 PROCEDURE — 0012A COVID-19, MRNA, LNP-S, PF, 100 MCG/0.5 ML DOSE VACCINE: CPT | Mod: CV19,S$GLB,, | Performed by: FAMILY MEDICINE

## 2021-03-27 PROCEDURE — 91301 COVID-19, MRNA, LNP-S, PF, 100 MCG/0.5 ML DOSE VACCINE: ICD-10-PCS | Mod: S$GLB,,, | Performed by: FAMILY MEDICINE

## 2021-03-27 PROCEDURE — 91301 COVID-19, MRNA, LNP-S, PF, 100 MCG/0.5 ML DOSE VACCINE: CPT | Mod: S$GLB,,, | Performed by: FAMILY MEDICINE

## 2021-04-05 ENCOUNTER — PATIENT MESSAGE (OUTPATIENT)
Dept: ADMINISTRATIVE | Facility: HOSPITAL | Age: 61
End: 2021-04-05

## 2021-04-06 RX ORDER — LOSARTAN POTASSIUM 100 MG/1
100 TABLET ORAL DAILY
Qty: 90 TABLET | Refills: 3 | Status: SHIPPED | OUTPATIENT
Start: 2021-04-06 | End: 2022-04-06 | Stop reason: SDUPTHER

## 2021-04-22 ENCOUNTER — PATIENT MESSAGE (OUTPATIENT)
Dept: ADMINISTRATIVE | Facility: HOSPITAL | Age: 61
End: 2021-04-22

## 2021-04-26 RX ORDER — HYDROCHLOROTHIAZIDE 12.5 MG/1
12.5 TABLET ORAL DAILY
Qty: 90 TABLET | Refills: 3 | Status: SHIPPED | OUTPATIENT
Start: 2021-04-26 | End: 2022-04-19

## 2021-05-14 ENCOUNTER — LAB VISIT (OUTPATIENT)
Dept: LAB | Facility: HOSPITAL | Age: 61
End: 2021-05-14
Attending: INTERNAL MEDICINE
Payer: COMMERCIAL

## 2021-05-14 DIAGNOSIS — Z11.4 SCREENING FOR HIV (HUMAN IMMUNODEFICIENCY VIRUS): ICD-10-CM

## 2021-05-14 DIAGNOSIS — I10 ESSENTIAL HYPERTENSION: ICD-10-CM

## 2021-05-14 DIAGNOSIS — E11.9 TYPE 2 DIABETES MELLITUS WITHOUT COMPLICATION, WITHOUT LONG-TERM CURRENT USE OF INSULIN: ICD-10-CM

## 2021-05-14 LAB
ALBUMIN SERPL BCP-MCNC: 4.1 G/DL (ref 3.5–5.2)
ALP SERPL-CCNC: 61 U/L (ref 55–135)
ALT SERPL W/O P-5'-P-CCNC: 32 U/L (ref 10–44)
ANION GAP SERPL CALC-SCNC: 9 MMOL/L (ref 8–16)
AST SERPL-CCNC: 28 U/L (ref 10–40)
BILIRUB SERPL-MCNC: 0.5 MG/DL (ref 0.1–1)
BUN SERPL-MCNC: 23 MG/DL (ref 6–20)
CALCIUM SERPL-MCNC: 9.8 MG/DL (ref 8.7–10.5)
CHLORIDE SERPL-SCNC: 104 MMOL/L (ref 95–110)
CO2 SERPL-SCNC: 27 MMOL/L (ref 23–29)
CREAT SERPL-MCNC: 1.3 MG/DL (ref 0.5–1.4)
EST. GFR  (AFRICAN AMERICAN): >60 ML/MIN/1.73 M^2
EST. GFR  (NON AFRICAN AMERICAN): >60 ML/MIN/1.73 M^2
ESTIMATED AVG GLUCOSE: 166 MG/DL (ref 68–131)
GLUCOSE SERPL-MCNC: 129 MG/DL (ref 70–110)
HBA1C MFR BLD: 7.4 % (ref 4–5.6)
POTASSIUM SERPL-SCNC: 4.3 MMOL/L (ref 3.5–5.1)
PROT SERPL-MCNC: 7.4 G/DL (ref 6–8.4)
SODIUM SERPL-SCNC: 140 MMOL/L (ref 136–145)

## 2021-05-14 PROCEDURE — 86703 HIV-1/HIV-2 1 RESULT ANTBDY: CPT | Performed by: INTERNAL MEDICINE

## 2021-05-14 PROCEDURE — 36415 COLL VENOUS BLD VENIPUNCTURE: CPT | Mod: PN | Performed by: INTERNAL MEDICINE

## 2021-05-14 PROCEDURE — 83036 HEMOGLOBIN GLYCOSYLATED A1C: CPT | Performed by: INTERNAL MEDICINE

## 2021-05-14 PROCEDURE — 80053 COMPREHEN METABOLIC PANEL: CPT | Performed by: INTERNAL MEDICINE

## 2021-05-17 ENCOUNTER — TELEPHONE (OUTPATIENT)
Dept: ADMINISTRATIVE | Facility: HOSPITAL | Age: 61
End: 2021-05-17

## 2021-05-17 ENCOUNTER — OFFICE VISIT (OUTPATIENT)
Dept: PRIMARY CARE CLINIC | Facility: CLINIC | Age: 61
End: 2021-05-17
Payer: COMMERCIAL

## 2021-05-17 VITALS
HEART RATE: 93 BPM | BODY MASS INDEX: 29.54 KG/M2 | HEIGHT: 70 IN | WEIGHT: 206.38 LBS | SYSTOLIC BLOOD PRESSURE: 130 MMHG | OXYGEN SATURATION: 98 % | DIASTOLIC BLOOD PRESSURE: 82 MMHG

## 2021-05-17 DIAGNOSIS — Z00.00 ANNUAL PHYSICAL EXAM: ICD-10-CM

## 2021-05-17 DIAGNOSIS — I10 ESSENTIAL HYPERTENSION: ICD-10-CM

## 2021-05-17 DIAGNOSIS — E11.9 TYPE 2 DIABETES MELLITUS WITHOUT COMPLICATION, WITHOUT LONG-TERM CURRENT USE OF INSULIN: Primary | ICD-10-CM

## 2021-05-17 DIAGNOSIS — Z12.5 SCREENING FOR MALIGNANT NEOPLASM OF PROSTATE: ICD-10-CM

## 2021-05-17 DIAGNOSIS — C09.9 SQUAMOUS CELL CARCINOMA OF LEFT TONSIL: ICD-10-CM

## 2021-05-17 LAB — HIV 1+2 AB+HIV1 P24 AG SERPL QL IA: NEGATIVE

## 2021-05-17 PROCEDURE — 99999 PR PBB SHADOW E&M-EST. PATIENT-LVL III: ICD-10-PCS | Mod: PBBFAC,,, | Performed by: INTERNAL MEDICINE

## 2021-05-17 PROCEDURE — 3051F HG A1C>EQUAL 7.0%<8.0%: CPT | Mod: CPTII,S$GLB,, | Performed by: INTERNAL MEDICINE

## 2021-05-17 PROCEDURE — 1126F AMNT PAIN NOTED NONE PRSNT: CPT | Mod: S$GLB,,, | Performed by: INTERNAL MEDICINE

## 2021-05-17 PROCEDURE — 3051F PR MOST RECENT HEMOGLOBIN A1C LEVEL 7.0 - < 8.0%: ICD-10-PCS | Mod: CPTII,S$GLB,, | Performed by: INTERNAL MEDICINE

## 2021-05-17 PROCEDURE — 3079F PR MOST RECENT DIASTOLIC BLOOD PRESSURE 80-89 MM HG: ICD-10-PCS | Mod: CPTII,S$GLB,, | Performed by: INTERNAL MEDICINE

## 2021-05-17 PROCEDURE — 3079F DIAST BP 80-89 MM HG: CPT | Mod: CPTII,S$GLB,, | Performed by: INTERNAL MEDICINE

## 2021-05-17 PROCEDURE — 1126F PR PAIN SEVERITY QUANTIFIED, NO PAIN PRESENT: ICD-10-PCS | Mod: S$GLB,,, | Performed by: INTERNAL MEDICINE

## 2021-05-17 PROCEDURE — 99214 OFFICE O/P EST MOD 30 MIN: CPT | Mod: S$GLB,,, | Performed by: INTERNAL MEDICINE

## 2021-05-17 PROCEDURE — 3008F PR BODY MASS INDEX (BMI) DOCUMENTED: ICD-10-PCS | Mod: CPTII,S$GLB,, | Performed by: INTERNAL MEDICINE

## 2021-05-17 PROCEDURE — 99214 PR OFFICE/OUTPT VISIT, EST, LEVL IV, 30-39 MIN: ICD-10-PCS | Mod: S$GLB,,, | Performed by: INTERNAL MEDICINE

## 2021-05-17 PROCEDURE — 3008F BODY MASS INDEX DOCD: CPT | Mod: CPTII,S$GLB,, | Performed by: INTERNAL MEDICINE

## 2021-05-17 PROCEDURE — 3075F SYST BP GE 130 - 139MM HG: CPT | Mod: CPTII,S$GLB,, | Performed by: INTERNAL MEDICINE

## 2021-05-17 PROCEDURE — 99999 PR PBB SHADOW E&M-EST. PATIENT-LVL III: CPT | Mod: PBBFAC,,, | Performed by: INTERNAL MEDICINE

## 2021-05-17 PROCEDURE — 3075F PR MOST RECENT SYSTOLIC BLOOD PRESS GE 130-139MM HG: ICD-10-PCS | Mod: CPTII,S$GLB,, | Performed by: INTERNAL MEDICINE

## 2021-05-17 RX ORDER — METFORMIN HYDROCHLORIDE 500 MG/1
500 TABLET ORAL 2 TIMES DAILY WITH MEALS
Qty: 180 TABLET | Refills: 3 | Status: SHIPPED | OUTPATIENT
Start: 2021-05-17 | End: 2022-07-18

## 2021-05-17 RX ORDER — ATORVASTATIN CALCIUM 10 MG/1
10 TABLET, FILM COATED ORAL DAILY
Qty: 90 TABLET | Refills: 3 | Status: SHIPPED | OUTPATIENT
Start: 2021-05-17 | End: 2022-07-06

## 2021-07-07 ENCOUNTER — PATIENT MESSAGE (OUTPATIENT)
Dept: ADMINISTRATIVE | Facility: HOSPITAL | Age: 61
End: 2021-07-07

## 2021-10-05 ENCOUNTER — PATIENT MESSAGE (OUTPATIENT)
Dept: ADMINISTRATIVE | Facility: HOSPITAL | Age: 61
End: 2021-10-05

## 2021-10-15 ENCOUNTER — TELEPHONE (OUTPATIENT)
Dept: ADMINISTRATIVE | Facility: HOSPITAL | Age: 61
End: 2021-10-15

## 2021-10-27 DIAGNOSIS — E11.9 TYPE 2 DIABETES MELLITUS WITHOUT COMPLICATION, UNSPECIFIED WHETHER LONG TERM INSULIN USE: ICD-10-CM

## 2021-11-19 ENCOUNTER — LAB VISIT (OUTPATIENT)
Dept: LAB | Facility: HOSPITAL | Age: 61
End: 2021-11-19
Attending: INTERNAL MEDICINE
Payer: COMMERCIAL

## 2021-11-19 DIAGNOSIS — Z12.5 SCREENING FOR MALIGNANT NEOPLASM OF PROSTATE: ICD-10-CM

## 2021-11-19 DIAGNOSIS — Z00.00 ANNUAL PHYSICAL EXAM: ICD-10-CM

## 2021-11-19 DIAGNOSIS — C09.9 SQUAMOUS CELL CARCINOMA OF LEFT TONSIL: ICD-10-CM

## 2021-11-19 DIAGNOSIS — E11.9 TYPE 2 DIABETES MELLITUS WITHOUT COMPLICATION, WITHOUT LONG-TERM CURRENT USE OF INSULIN: ICD-10-CM

## 2021-11-19 LAB
ALBUMIN SERPL BCP-MCNC: 4.2 G/DL (ref 3.5–5.2)
ALBUMIN/CREAT UR: NORMAL UG/MG (ref 0–30)
ALP SERPL-CCNC: 58 U/L (ref 55–135)
ALT SERPL W/O P-5'-P-CCNC: 23 U/L (ref 10–44)
ANION GAP SERPL CALC-SCNC: 10 MMOL/L (ref 8–16)
AST SERPL-CCNC: 24 U/L (ref 10–40)
BASOPHILS # BLD AUTO: 0.01 K/UL (ref 0–0.2)
BASOPHILS NFR BLD: 0.2 % (ref 0–1.9)
BILIRUB SERPL-MCNC: 0.5 MG/DL (ref 0.1–1)
BUN SERPL-MCNC: 14 MG/DL (ref 6–20)
CALCIUM SERPL-MCNC: 9.6 MG/DL (ref 8.7–10.5)
CHLORIDE SERPL-SCNC: 102 MMOL/L (ref 95–110)
CHOLEST SERPL-MCNC: 128 MG/DL (ref 120–199)
CHOLEST/HDLC SERPL: 3.5 {RATIO} (ref 2–5)
CO2 SERPL-SCNC: 26 MMOL/L (ref 23–29)
COMPLEXED PSA SERPL-MCNC: 0.92 NG/ML (ref 0–4)
CREAT SERPL-MCNC: 1.2 MG/DL (ref 0.5–1.4)
CREAT UR-MCNC: 64 MG/DL (ref 23–375)
DIFFERENTIAL METHOD: NORMAL
EOSINOPHIL # BLD AUTO: 0.1 K/UL (ref 0–0.5)
EOSINOPHIL NFR BLD: 2.2 % (ref 0–8)
ERYTHROCYTE [DISTWIDTH] IN BLOOD BY AUTOMATED COUNT: 13.2 % (ref 11.5–14.5)
EST. GFR  (AFRICAN AMERICAN): >60 ML/MIN/1.73 M^2
EST. GFR  (NON AFRICAN AMERICAN): >60 ML/MIN/1.73 M^2
ESTIMATED AVG GLUCOSE: 160 MG/DL (ref 68–131)
GLUCOSE SERPL-MCNC: 156 MG/DL (ref 70–110)
HBA1C MFR BLD: 7.2 % (ref 4–5.6)
HCT VFR BLD AUTO: 43.2 % (ref 40–54)
HDLC SERPL-MCNC: 37 MG/DL (ref 40–75)
HDLC SERPL: 28.9 % (ref 20–50)
HGB BLD-MCNC: 14 G/DL (ref 14–18)
IMM GRANULOCYTES # BLD AUTO: 0.01 K/UL (ref 0–0.04)
IMM GRANULOCYTES NFR BLD AUTO: 0.2 % (ref 0–0.5)
LDLC SERPL CALC-MCNC: 62.2 MG/DL (ref 63–159)
LYMPHOCYTES # BLD AUTO: 1.4 K/UL (ref 1–4.8)
LYMPHOCYTES NFR BLD: 33.8 % (ref 18–48)
MCH RBC QN AUTO: 28.7 PG (ref 27–31)
MCHC RBC AUTO-ENTMCNC: 32.4 G/DL (ref 32–36)
MCV RBC AUTO: 89 FL (ref 82–98)
MICROALBUMIN UR DL<=1MG/L-MCNC: <5 UG/ML
MONOCYTES # BLD AUTO: 0.4 K/UL (ref 0.3–1)
MONOCYTES NFR BLD: 8.9 % (ref 4–15)
NEUTROPHILS # BLD AUTO: 2.3 K/UL (ref 1.8–7.7)
NEUTROPHILS NFR BLD: 54.7 % (ref 38–73)
NONHDLC SERPL-MCNC: 91 MG/DL
NRBC BLD-RTO: 0 /100 WBC
PLATELET # BLD AUTO: 217 K/UL (ref 150–450)
PMV BLD AUTO: 10.5 FL (ref 9.2–12.9)
POTASSIUM SERPL-SCNC: 4.3 MMOL/L (ref 3.5–5.1)
PROT SERPL-MCNC: 7.3 G/DL (ref 6–8.4)
RBC # BLD AUTO: 4.88 M/UL (ref 4.6–6.2)
SODIUM SERPL-SCNC: 138 MMOL/L (ref 136–145)
T4 FREE SERPL-MCNC: 0.8 NG/DL (ref 0.71–1.51)
TRIGL SERPL-MCNC: 144 MG/DL (ref 30–150)
TSH SERPL DL<=0.005 MIU/L-ACNC: 2.48 UIU/ML (ref 0.4–4)
WBC # BLD AUTO: 4.14 K/UL (ref 3.9–12.7)

## 2021-11-19 PROCEDURE — 85025 COMPLETE CBC W/AUTO DIFF WBC: CPT | Performed by: INTERNAL MEDICINE

## 2021-11-19 PROCEDURE — 84443 ASSAY THYROID STIM HORMONE: CPT | Performed by: INTERNAL MEDICINE

## 2021-11-19 PROCEDURE — 84153 ASSAY OF PSA TOTAL: CPT | Performed by: INTERNAL MEDICINE

## 2021-11-19 PROCEDURE — 83036 HEMOGLOBIN GLYCOSYLATED A1C: CPT | Performed by: INTERNAL MEDICINE

## 2021-11-19 PROCEDURE — 84439 ASSAY OF FREE THYROXINE: CPT | Performed by: INTERNAL MEDICINE

## 2021-11-19 PROCEDURE — 36415 COLL VENOUS BLD VENIPUNCTURE: CPT | Mod: PN | Performed by: INTERNAL MEDICINE

## 2021-11-19 PROCEDURE — 80053 COMPREHEN METABOLIC PANEL: CPT | Performed by: INTERNAL MEDICINE

## 2021-11-19 PROCEDURE — 82570 ASSAY OF URINE CREATININE: CPT | Performed by: INTERNAL MEDICINE

## 2021-11-19 PROCEDURE — 80061 LIPID PANEL: CPT | Performed by: INTERNAL MEDICINE

## 2021-11-22 ENCOUNTER — PATIENT OUTREACH (OUTPATIENT)
Dept: ADMINISTRATIVE | Facility: HOSPITAL | Age: 61
End: 2021-11-22
Payer: COMMERCIAL

## 2021-11-22 ENCOUNTER — OFFICE VISIT (OUTPATIENT)
Dept: PRIMARY CARE CLINIC | Facility: CLINIC | Age: 61
End: 2021-11-22
Payer: COMMERCIAL

## 2021-11-22 ENCOUNTER — TELEPHONE (OUTPATIENT)
Dept: ADMINISTRATIVE | Facility: HOSPITAL | Age: 61
End: 2021-11-22
Payer: COMMERCIAL

## 2021-11-22 VITALS
OXYGEN SATURATION: 97 % | WEIGHT: 200.81 LBS | DIASTOLIC BLOOD PRESSURE: 88 MMHG | SYSTOLIC BLOOD PRESSURE: 138 MMHG | HEART RATE: 95 BPM | BODY MASS INDEX: 28.75 KG/M2 | HEIGHT: 70 IN

## 2021-11-22 DIAGNOSIS — E11.9 TYPE 2 DIABETES MELLITUS WITHOUT COMPLICATION, WITHOUT LONG-TERM CURRENT USE OF INSULIN: Primary | ICD-10-CM

## 2021-11-22 DIAGNOSIS — I10 ESSENTIAL HYPERTENSION: ICD-10-CM

## 2021-11-22 DIAGNOSIS — C09.9 SQUAMOUS CELL CARCINOMA OF LEFT TONSIL: ICD-10-CM

## 2021-11-22 PROCEDURE — 3061F PR NEG MICROALBUMINURIA RESULT DOCUMENTED/REVIEW: ICD-10-PCS | Mod: CPTII,S$GLB,, | Performed by: INTERNAL MEDICINE

## 2021-11-22 PROCEDURE — 4010F PR ACE/ARB THEARPY RXD/TAKEN: ICD-10-PCS | Mod: CPTII,S$GLB,, | Performed by: INTERNAL MEDICINE

## 2021-11-22 PROCEDURE — 99999 PR PBB SHADOW E&M-EST. PATIENT-LVL III: CPT | Mod: PBBFAC,,, | Performed by: INTERNAL MEDICINE

## 2021-11-22 PROCEDURE — 99999 PR PBB SHADOW E&M-EST. PATIENT-LVL III: ICD-10-PCS | Mod: PBBFAC,,, | Performed by: INTERNAL MEDICINE

## 2021-11-22 PROCEDURE — 3066F NEPHROPATHY DOC TX: CPT | Mod: CPTII,S$GLB,, | Performed by: INTERNAL MEDICINE

## 2021-11-22 PROCEDURE — 99396 PR PREVENTIVE VISIT,EST,40-64: ICD-10-PCS | Mod: S$GLB,,, | Performed by: INTERNAL MEDICINE

## 2021-11-22 PROCEDURE — 99396 PREV VISIT EST AGE 40-64: CPT | Mod: S$GLB,,, | Performed by: INTERNAL MEDICINE

## 2021-11-22 PROCEDURE — 3061F NEG MICROALBUMINURIA REV: CPT | Mod: CPTII,S$GLB,, | Performed by: INTERNAL MEDICINE

## 2021-11-22 PROCEDURE — 3066F PR DOCUMENTATION OF TREATMENT FOR NEPHROPATHY: ICD-10-PCS | Mod: CPTII,S$GLB,, | Performed by: INTERNAL MEDICINE

## 2021-11-22 PROCEDURE — 4010F ACE/ARB THERAPY RXD/TAKEN: CPT | Mod: CPTII,S$GLB,, | Performed by: INTERNAL MEDICINE

## 2021-12-06 ENCOUNTER — PATIENT OUTREACH (OUTPATIENT)
Dept: ADMINISTRATIVE | Facility: HOSPITAL | Age: 61
End: 2021-12-06
Payer: COMMERCIAL

## 2021-12-20 ENCOUNTER — TELEPHONE (OUTPATIENT)
Dept: ADMINISTRATIVE | Facility: HOSPITAL | Age: 61
End: 2021-12-20
Payer: COMMERCIAL

## 2021-12-27 ENCOUNTER — PATIENT MESSAGE (OUTPATIENT)
Dept: ADMINISTRATIVE | Facility: HOSPITAL | Age: 61
End: 2021-12-27
Payer: COMMERCIAL

## 2022-03-13 DIAGNOSIS — E11.9 TYPE 2 DIABETES MELLITUS WITHOUT COMPLICATION: ICD-10-CM

## 2022-04-06 RX ORDER — LOSARTAN POTASSIUM 100 MG/1
100 TABLET ORAL DAILY
Qty: 90 TABLET | Refills: 3 | Status: SHIPPED | OUTPATIENT
Start: 2022-04-06 | End: 2023-04-03 | Stop reason: SDUPTHER

## 2022-04-19 RX ORDER — HYDROCHLOROTHIAZIDE 12.5 MG/1
TABLET ORAL
Qty: 90 TABLET | Refills: 1 | Status: SHIPPED | OUTPATIENT
Start: 2022-04-19 | End: 2022-10-03

## 2022-04-19 NOTE — TELEPHONE ENCOUNTER
No new care gaps identified.  Powered by [employer]. Reference number: [id number].   4/19/2022 3:45:40 AM CDT

## 2022-04-19 NOTE — TELEPHONE ENCOUNTER
Refill Authorization Note   Cristhian Gregory  is requesting a refill authorization.  Brief Assessment and Rationale for Refill:  Approve     Medication Therapy Plan:       Medication Reconciliation Completed: No   Comments:     No Care Gaps recommended.     Note composed:12:04 PM 04/19/2022

## 2022-05-30 ENCOUNTER — PATIENT MESSAGE (OUTPATIENT)
Dept: ADMINISTRATIVE | Facility: HOSPITAL | Age: 62
End: 2022-05-30
Payer: COMMERCIAL

## 2022-07-06 DIAGNOSIS — E11.9 TYPE 2 DIABETES MELLITUS WITHOUT COMPLICATION, WITHOUT LONG-TERM CURRENT USE OF INSULIN: ICD-10-CM

## 2022-07-06 RX ORDER — ATORVASTATIN CALCIUM 10 MG/1
TABLET, FILM COATED ORAL
Qty: 90 TABLET | Refills: 1 | Status: SHIPPED | OUTPATIENT
Start: 2022-07-06 | End: 2023-01-26

## 2022-07-06 NOTE — TELEPHONE ENCOUNTER
Care Due:                  Date            Visit Type   Department     Provider  --------------------------------------------------------------------------------                                ESTABLISHED                              PATIENT      [facility]  Last Visit: 11-      [facility]           Bethel Marrufo  Next Visit: None Scheduled  None         None Found                                                            Last  Test          Frequency    Reason                     Performed    Due Date  --------------------------------------------------------------------------------    HBA1C.......  6 months...  metFORMIN................  11- 05-    [facility] Embedded Care Gaps. Reference number: [id number]. 7/06/2022   3:45:13 AM CDT

## 2022-07-06 NOTE — TELEPHONE ENCOUNTER
Refill Decision Note   Cristhian Gregory  is requesting a refill authorization.  Brief Assessment and Rationale for Refill:  Approve    -Medication-Related Problems Identified: Requires labs  Medication Therapy Plan:       Medication Reconciliation Completed: No   Comments:     Provider Staff:     Action is required for this patient.   Please see care gap opportunities below in Care Due Message.     Thanks!  [facility]     Appointments      Date Provider   Last Visit   11/22/2021 Bethel Echols MD   Next Visit   Visit date not found Bethel Echols MD     Note composed:12:36 PM 07/06/2022           Note composed:12:36 PM 07/06/2022

## 2022-07-11 ENCOUNTER — PATIENT MESSAGE (OUTPATIENT)
Dept: ADMINISTRATIVE | Facility: HOSPITAL | Age: 62
End: 2022-07-11
Payer: COMMERCIAL

## 2022-08-17 ENCOUNTER — PATIENT MESSAGE (OUTPATIENT)
Dept: ADMINISTRATIVE | Facility: HOSPITAL | Age: 62
End: 2022-08-17
Payer: COMMERCIAL

## 2022-08-24 ENCOUNTER — PATIENT MESSAGE (OUTPATIENT)
Dept: ADMINISTRATIVE | Facility: HOSPITAL | Age: 62
End: 2022-08-24
Payer: COMMERCIAL

## 2022-10-10 ENCOUNTER — PATIENT MESSAGE (OUTPATIENT)
Dept: ADMINISTRATIVE | Facility: HOSPITAL | Age: 62
End: 2022-10-10

## 2022-11-16 ENCOUNTER — PATIENT MESSAGE (OUTPATIENT)
Dept: ADMINISTRATIVE | Facility: HOSPITAL | Age: 62
End: 2022-11-16

## 2023-02-27 ENCOUNTER — PATIENT MESSAGE (OUTPATIENT)
Dept: PRIMARY CARE CLINIC | Facility: CLINIC | Age: 63
End: 2023-02-27

## 2023-02-27 DIAGNOSIS — Z00.00 ANNUAL PHYSICAL EXAM: Primary | ICD-10-CM

## 2023-02-27 DIAGNOSIS — E11.9 TYPE 2 DIABETES MELLITUS WITHOUT COMPLICATION, WITHOUT LONG-TERM CURRENT USE OF INSULIN: ICD-10-CM

## 2023-03-01 ENCOUNTER — PATIENT MESSAGE (OUTPATIENT)
Dept: PRIMARY CARE CLINIC | Facility: CLINIC | Age: 63
End: 2023-03-01

## 2023-03-02 DIAGNOSIS — E11.9 TYPE 2 DIABETES MELLITUS WITHOUT COMPLICATION, UNSPECIFIED WHETHER LONG TERM INSULIN USE: ICD-10-CM

## 2023-03-08 ENCOUNTER — LAB VISIT (OUTPATIENT)
Dept: LAB | Facility: HOSPITAL | Age: 63
End: 2023-03-08
Attending: INTERNAL MEDICINE
Payer: COMMERCIAL

## 2023-03-08 DIAGNOSIS — E11.9 TYPE 2 DIABETES MELLITUS WITHOUT COMPLICATION, WITHOUT LONG-TERM CURRENT USE OF INSULIN: ICD-10-CM

## 2023-03-08 DIAGNOSIS — Z00.00 ANNUAL PHYSICAL EXAM: ICD-10-CM

## 2023-03-08 LAB
ALBUMIN SERPL BCP-MCNC: 4.4 G/DL (ref 3.5–5.2)
ALP SERPL-CCNC: 38 U/L (ref 55–135)
ALT SERPL W/O P-5'-P-CCNC: 18 U/L (ref 10–44)
ANION GAP SERPL CALC-SCNC: 9 MMOL/L (ref 8–16)
AST SERPL-CCNC: 21 U/L (ref 10–40)
BILIRUB SERPL-MCNC: 1 MG/DL (ref 0.1–1)
BUN SERPL-MCNC: 21 MG/DL (ref 6–20)
CALCIUM SERPL-MCNC: 10 MG/DL (ref 8.7–10.5)
CHLORIDE SERPL-SCNC: 103 MMOL/L (ref 95–110)
CHOLEST SERPL-MCNC: 159 MG/DL (ref 120–199)
CHOLEST/HDLC SERPL: 4.3 {RATIO} (ref 2–5)
CO2 SERPL-SCNC: 27 MMOL/L (ref 23–29)
CREAT SERPL-MCNC: 1.4 MG/DL (ref 0.5–1.4)
ERYTHROCYTE [DISTWIDTH] IN BLOOD BY AUTOMATED COUNT: 12.5 % (ref 11.5–14.5)
EST. GFR  (NO RACE VARIABLE): 57.5 ML/MIN/1.73 M^2
ESTIMATED AVG GLUCOSE: 160 MG/DL (ref 68–131)
GLUCOSE SERPL-MCNC: 130 MG/DL (ref 70–110)
HBA1C MFR BLD: 7.2 % (ref 4–5.6)
HCT VFR BLD AUTO: 44.6 % (ref 40–54)
HDLC SERPL-MCNC: 37 MG/DL (ref 40–75)
HDLC SERPL: 23.3 % (ref 20–50)
HGB BLD-MCNC: 14.5 G/DL (ref 14–18)
LDLC SERPL CALC-MCNC: 92.4 MG/DL (ref 63–159)
MCH RBC QN AUTO: 28.9 PG (ref 27–31)
MCHC RBC AUTO-ENTMCNC: 32.5 G/DL (ref 32–36)
MCV RBC AUTO: 89 FL (ref 82–98)
NONHDLC SERPL-MCNC: 122 MG/DL
PLATELET # BLD AUTO: 266 K/UL (ref 150–450)
PMV BLD AUTO: 10.8 FL (ref 9.2–12.9)
POTASSIUM SERPL-SCNC: 4.2 MMOL/L (ref 3.5–5.1)
PROT SERPL-MCNC: 7.6 G/DL (ref 6–8.4)
RBC # BLD AUTO: 5.02 M/UL (ref 4.6–6.2)
SODIUM SERPL-SCNC: 139 MMOL/L (ref 136–145)
TRIGL SERPL-MCNC: 148 MG/DL (ref 30–150)
TSH SERPL DL<=0.005 MIU/L-ACNC: 1.91 UIU/ML (ref 0.4–4)
WBC # BLD AUTO: 5.28 K/UL (ref 3.9–12.7)

## 2023-03-08 PROCEDURE — 36415 COLL VENOUS BLD VENIPUNCTURE: CPT | Mod: PN | Performed by: INTERNAL MEDICINE

## 2023-03-08 PROCEDURE — 83036 HEMOGLOBIN GLYCOSYLATED A1C: CPT | Performed by: INTERNAL MEDICINE

## 2023-03-08 PROCEDURE — 84443 ASSAY THYROID STIM HORMONE: CPT | Performed by: INTERNAL MEDICINE

## 2023-03-08 PROCEDURE — 80053 COMPREHEN METABOLIC PANEL: CPT | Performed by: INTERNAL MEDICINE

## 2023-03-08 PROCEDURE — 85027 COMPLETE CBC AUTOMATED: CPT | Performed by: INTERNAL MEDICINE

## 2023-03-08 PROCEDURE — 84153 ASSAY OF PSA TOTAL: CPT | Performed by: INTERNAL MEDICINE

## 2023-03-08 PROCEDURE — 80061 LIPID PANEL: CPT | Performed by: INTERNAL MEDICINE

## 2023-03-09 LAB — COMPLEXED PSA SERPL-MCNC: 0.99 NG/ML (ref 0–4)

## 2023-03-14 ENCOUNTER — OFFICE VISIT (OUTPATIENT)
Dept: PRIMARY CARE CLINIC | Facility: CLINIC | Age: 63
End: 2023-03-14
Payer: COMMERCIAL

## 2023-03-14 VITALS
WEIGHT: 192.69 LBS | OXYGEN SATURATION: 98 % | DIASTOLIC BLOOD PRESSURE: 90 MMHG | HEART RATE: 76 BPM | HEIGHT: 70 IN | BODY MASS INDEX: 27.58 KG/M2 | SYSTOLIC BLOOD PRESSURE: 144 MMHG

## 2023-03-14 DIAGNOSIS — I10 ESSENTIAL HYPERTENSION: ICD-10-CM

## 2023-03-14 DIAGNOSIS — E11.9 TYPE 2 DIABETES MELLITUS WITHOUT COMPLICATION, WITHOUT LONG-TERM CURRENT USE OF INSULIN: Primary | ICD-10-CM

## 2023-03-14 DIAGNOSIS — C09.9 SQUAMOUS CELL CARCINOMA OF LEFT TONSIL: ICD-10-CM

## 2023-03-14 DIAGNOSIS — F43.20 ADJUSTMENT DISORDER, UNSPECIFIED TYPE: ICD-10-CM

## 2023-03-14 PROCEDURE — 4010F ACE/ARB THERAPY RXD/TAKEN: CPT | Mod: ,,, | Performed by: INTERNAL MEDICINE

## 2023-03-14 PROCEDURE — 4010F PR ACE/ARB THEARPY RXD/TAKEN: ICD-10-PCS | Mod: ,,, | Performed by: INTERNAL MEDICINE

## 2023-03-14 PROCEDURE — 99396 PR PREVENTIVE VISIT,EST,40-64: ICD-10-PCS | Mod: S$PBB,,, | Performed by: INTERNAL MEDICINE

## 2023-03-14 PROCEDURE — 3066F PR DOCUMENTATION OF TREATMENT FOR NEPHROPATHY: ICD-10-PCS | Mod: ,,, | Performed by: INTERNAL MEDICINE

## 2023-03-14 PROCEDURE — 3061F PR NEG MICROALBUMINURIA RESULT DOCUMENTED/REVIEW: ICD-10-PCS | Mod: ,,, | Performed by: INTERNAL MEDICINE

## 2023-03-14 PROCEDURE — 99999 PR PBB SHADOW E&M-EST. PATIENT-LVL IV: CPT | Mod: PBBFAC,,, | Performed by: INTERNAL MEDICINE

## 2023-03-14 PROCEDURE — 99999 PR PBB SHADOW E&M-EST. PATIENT-LVL IV: ICD-10-PCS | Mod: PBBFAC,,, | Performed by: INTERNAL MEDICINE

## 2023-03-14 PROCEDURE — 99396 PREV VISIT EST AGE 40-64: CPT | Mod: S$PBB,,, | Performed by: INTERNAL MEDICINE

## 2023-03-14 PROCEDURE — 3061F NEG MICROALBUMINURIA REV: CPT | Mod: ,,, | Performed by: INTERNAL MEDICINE

## 2023-03-14 PROCEDURE — 3066F NEPHROPATHY DOC TX: CPT | Mod: ,,, | Performed by: INTERNAL MEDICINE

## 2023-03-14 NOTE — PROGRESS NOTES
[facility] Note    Chief Complaint      Chief Complaint   Patient presents with    Annual Exam       History of Present Illness      Cristhian Gregory is a 62 y.o. male with chronic conditions of DM2, HTN, SCC tongue who presents today for: annual preventative visit.  Has been attending grief counseling since his wife passed away last year.  DM2: A1C 7.2. On metformin.  On lipitor for statin indication with diabetes.  Eye exam scheduled with Dr. Villa.  HTN: BP at goal on losartan, hctz.    Squamous cell carcinoma, left tonsil: Surveillance has been doing well.     Diet: Prepares own food mostly.  Limiting fatty foods and carbs.  Drinks plenty water.  Exercise: Increasing activity.  Denies drinking and driving, drinking more than 4 drinks on occasion, drug use.     Flu shot due.  TdAP 2019.  Shingles vaccine due age 62.  Pneumovax UTD.  Prevnar due age 67.  COVID vaccine UTD.  Cscope, Dr. Tejada, 2020, polyp, 5 yr interval.     Past Medical History:  Past Medical History:   Diagnosis Date    Diabetes mellitus, type 2     Hypertension        Past Surgical History:   has no past surgical history on file.    Family History:  family history includes Arthritis in his mother; Cancer in his mother; Diabetes in his mother; Heart disease in his father; No Known Problems in his brother and sister.     Social History:  Social History     Tobacco Use    Smoking status: Never    Smokeless tobacco: Never   Substance Use Topics    Alcohol use: Never    Drug use: Never       I personally reviewed all past medical, surgical, social and family history.    Review of Systems   Constitutional:  Negative for chills, fever and malaise/fatigue.   Respiratory:  Negative for shortness of breath.    Cardiovascular:  Negative for chest pain.   Gastrointestinal:  Negative for constipation, diarrhea, nausea and vomiting.   Skin:  Negative for rash.   Neurological:  Negative for weakness.   All other systems reviewed and are  "negative.     Medications:  Outpatient Encounter Medications as of 3/14/2023   Medication Sig Dispense Refill    atorvastatin (LIPITOR) 10 MG tablet TAKE 1 TABLET(10 MG) BY MOUTH EVERY DAY 90 tablet 3    blood sugar diagnostic (TRUE METRIX GLUCOSE TEST STRIP) Strp 1 strip by Misc.(Non-Drug; Combo Route) route 2 (two) times a day. 200 each 3    blood-glucose meter kit Use as instructed 1 each 0    hydroCHLOROthiazide (HYDRODIURIL) 12.5 MG Tab Take 1 tablet (12.5 mg total) by mouth once daily. 90 tablet 0    hydrocortisone 2.5 % cream Apply topically 2 (two) times daily. 20 g 3    lancets Misc 1 each by Misc.(Non-Drug; Combo Route) route once daily. 100 each 11    losartan (COZAAR) 100 MG tablet Take 1 tablet (100 mg total) by mouth once daily. 90 tablet 3    metFORMIN (GLUCOPHAGE) 500 MG tablet TAKE 1 TABLET(500 MG) BY MOUTH TWICE DAILY WITH MEALS 180 tablet 3     No facility-administered encounter medications on file as of 3/14/2023.       Allergies:  Review of patient's allergies indicates:   Allergen Reactions    Ampicillin        Health Maintenance:  Immunization History   Administered Date(s) Administered    COVID-19, MRNA, LN-S, PF (MODERNA FULL 0.5 ML DOSE) 02/27/2021, 03/27/2021    Influenza 12/10/2021    Influenza - Quadrivalent - MDCK - PF 12/17/2021    Influenza - Quadrivalent - PF *Preferred* (6 months and older) 11/25/2019, 09/11/2020    Pneumococcal Polysaccharide - 23 Valent 03/02/2020    Tdap 11/25/2019      Health Maintenance   Topic Date Due    Eye Exam  06/26/2020    Foot Exam  05/17/2022    Hemoglobin A1c  09/08/2023    Low Dose Statin  01/26/2024    Lipid Panel  03/08/2024    TETANUS VACCINE  11/25/2029    Hepatitis C Screening  Completed        Physical Exam      Vital Signs  Pulse: 76  SpO2: 98 %  BP: (!) 144/90  BP Location: Right arm  Patient Position: Sitting  Pain Score: 0-No pain  Height and Weight  Height: 5' 10" (177.8 cm)  Weight: 87.4 kg (192 lb 10.9 oz)  BSA (Calculated - sq m): 2.08 " sq meters  BMI (Calculated): 27.6  Weight in (lb) to have BMI = 25: 173.9]    Physical Exam  Vitals reviewed.   Constitutional:       Appearance: He is well-developed.   HENT:      Head: Normocephalic and atraumatic.      Right Ear: External ear normal.      Left Ear: External ear normal.   Cardiovascular:      Rate and Rhythm: Normal rate and regular rhythm.      Heart sounds: Normal heart sounds. No murmur heard.  Pulmonary:      Effort: Pulmonary effort is normal.      Breath sounds: Normal breath sounds. No wheezing or rales.   Abdominal:      General: Bowel sounds are normal.      Palpations: Abdomen is soft.        Laboratory:  CBC:  Recent Labs   Lab 09/04/20  0704 11/19/21  0706 03/08/23  0915   WBC 4.29 4.14 5.28   RBC 4.94 4.88 5.02   Hemoglobin 14.0 14.0 14.5   Hematocrit 44.8 43.2 44.6   Platelets 205 217 266   MCV 91 89 89   MCH 28.3 28.7 28.9   MCHC 31.3 L 32.4 32.5     CMP:  Recent Labs   Lab 05/14/21  0720 11/19/21  0706 03/08/23  0915   Glucose 129 H 156 H 130 H   Calcium 9.8 9.6 10.0   Albumin 4.1 4.2 4.4   Total Protein 7.4 7.3 7.6   Sodium 140 138 139   Potassium 4.3 4.3 4.2   CO2 27 26 27   Chloride 104 102 103   BUN 23 H 14 21 H   Alkaline Phosphatase 61 58 38 L   ALT 32 23 18   AST 28 24 21   Total Bilirubin 0.5 0.5 1.0     URINALYSIS:       LIPIDS:  Recent Labs   Lab 09/04/20  0704 11/19/21  0706 03/08/23  0915   TSH 2.557 2.477 1.907   HDL 37 L 37 L 37 L   Cholesterol 166 128 159   Triglycerides 206 H 144 148   LDL Cholesterol 87.8 62.2 L 92.4   HDL/Cholesterol Ratio 22.3 28.9 23.3   Non-HDL Cholesterol 129 91 122   Total Cholesterol/HDL Ratio 4.5 3.5 4.3     TSH:  Recent Labs   Lab 09/04/20  0704 11/19/21  0706 03/08/23  0915   TSH 2.557 2.477 1.907     A1C:  Recent Labs   Lab 09/04/20  0704 05/14/21  0720 11/19/21  0706 03/08/23  0915   Hemoglobin A1C 6.8 H 7.4 H 7.2 H 7.2 H       Assessment/Plan     Cristhian Gregory is a 62 y.o.male with:    1. Type 2 diabetes mellitus without  complication, without long-term current use of insulin  - Comprehensive Metabolic Panel; Future  - Hemoglobin A1C; Future  Continue current meds.  Update labs.  Eye exam UTD  2. Essential hypertension  Continue current meds.    3. Squamous cell carcinoma of left tonsil  F/U with ENT  4. Adjustment disorder, unspecified type  - Ambulatory referral/consult to Psychology; Future  Referring to psychology for therapy    Chronic conditions status updated as per HPI.  Other than changes above, cont current medications and maintain follow up with specialists.  Follow up in about 6 months (around 9/14/2023) for Follow up visit.    Future Appointments   Date Time Provider Department Center   9/12/2023  7:30 AM LAB [facility]   9/15/2023  8:15 AM Bethel Echols MD [facility]       Bethel Echols MD  [facility]

## 2023-04-03 ENCOUNTER — PATIENT MESSAGE (OUTPATIENT)
Dept: ADMINISTRATIVE | Facility: HOSPITAL | Age: 63
End: 2023-04-03

## 2023-04-05 ENCOUNTER — PATIENT OUTREACH (OUTPATIENT)
Dept: ADMINISTRATIVE | Facility: HOSPITAL | Age: 63
End: 2023-04-05

## 2023-04-05 NOTE — PROGRESS NOTES
Chart review done.  updated. Immunizations reviewed & updated. Care Everywhere updated.  Health Maintenance Due   Topic Date Due    Shingles Vaccine (1 of 2) Never done    Eye Exam  06/26/2020    Pneumococcal Vaccines (Age 0-64) (2 - PCV) 03/02/2021    COVID-19 Vaccine (3 - Booster for Moderna series) 05/22/2021    Foot Exam  05/17/2022    Influenza Vaccine (1) 09/01/2022     SHARYN to [facility]  For DM Eye Exam.

## 2023-04-05 NOTE — PROGRESS NOTES
Health Maintenance Due   Topic Date Due    Shingles Vaccine (1 of 2) Never done    Eye Exam  06/26/2020    Pneumococcal Vaccines (Age 0-64) (2 - PCV) 03/02/2021    COVID-19 Vaccine (3 - Booster for Moderna series) 05/22/2021    Foot Exam  05/17/2022    Influenza Vaccine (1) 09/01/2022     Chart review done. HM updated. Immunizations reviewed & updated. Care Everywhere updated.  Portal Message sent re:  DM Eye Exam. Pt states he has seen provider at [facility]. Replied asking for more details.

## 2023-04-05 NOTE — LETTER
AUTHORIZATION FOR RELEASE OF   CONFIDENTIAL INFORMATION    Dear [facility] ,    We are seeing Cristhian Gregory, date of birth 1960, in the [facility]. Bethel Echols MD is the patient's PCP. Cristhian Gregory has an outstanding lab/procedure at the time we reviewed his chart. In order to help keep his health information updated, he has authorized us to request the following medical record(s):        (  )  MAMMOGRAM                                      (  )  COLONOSCOPY      (  )  PAP SMEAR                                          (  )  OUTSIDE LAB RESULTS     (  )  DEXA SCAN                                          (XX)  DIABETIC EYE EXAM            (  )  FOOT EXAM                                          (  )  ENTIRE RECORD     (  )  OUTSIDE IMMUNIZATIONS                 (  )  _______________         Please fax records to Bethel Echols MD, [phone].     If you have any questions, please contact TAIWO Rodriguez at [phone].           Patient Name: Cristhian Gregory  : 1960  Patient Phone #: [phone]

## 2023-05-04 RX ORDER — HYDROCHLOROTHIAZIDE 12.5 MG/1
TABLET ORAL
Qty: 90 TABLET | Refills: 3 | Status: SHIPPED | OUTPATIENT
Start: 2023-05-04 | End: 2023-11-17 | Stop reason: SDUPTHER

## 2023-07-25 ENCOUNTER — PATIENT MESSAGE (OUTPATIENT)
Dept: PRIMARY CARE CLINIC | Facility: CLINIC | Age: 63
End: 2023-07-25
Payer: COMMERCIAL

## 2023-07-25 DIAGNOSIS — N52.9 ERECTILE DYSFUNCTION, UNSPECIFIED ERECTILE DYSFUNCTION TYPE: Primary | ICD-10-CM

## 2023-07-25 RX ORDER — TADALAFIL 20 MG/1
20 TABLET ORAL DAILY PRN
Qty: 30 TABLET | Refills: 2 | Status: SHIPPED | OUTPATIENT
Start: 2023-07-25 | End: 2024-07-24

## 2023-07-25 NOTE — TELEPHONE ENCOUNTER
Sending in cialis to try but would recommend pricing around before getting filled.  Look at [employer] prices

## 2023-07-31 ENCOUNTER — PATIENT OUTREACH (OUTPATIENT)
Dept: ADMINISTRATIVE | Facility: HOSPITAL | Age: 63
End: 2023-07-31
Payer: COMMERCIAL

## 2023-07-31 NOTE — LETTER
AUTHORIZATION FOR RELEASE OF   CONFIDENTIAL INFORMATION    Dear [facility],    We are seeing Cristhian Gregory, date of birth 1960, in the clinic at [facility]. Bethel Echols MD is the patient's PCP. Cristhian Gregory has an outstanding lab/procedure at the time we reviewed his chart. In order to help keep his health information updated, he has authorized us to request the following medical record(s):        (XX)  DIABETIC EYE EXAM        **Please indicate whether or not patient has Diabetic Retinopathy**                 Please fax records to Bethel Echols MD, [phone].     If you have any questions, please contact TAIWO Rodriguez at [phone].           Patient Name: Cristhian Gregory  : 1960  Patient Phone #: [phone]

## 2023-07-31 NOTE — PROGRESS NOTES
Health Maintenance Due   Topic Date Due    Shingles Vaccine (1 of 2) Never done    Eye Exam  06/26/2020    Pneumococcal Vaccines (Age 0-64) (2 - PCV) 03/02/2021    COVID-19 Vaccine (3 - Moderna series) 05/22/2021    Foot Exam  05/17/2022     Chart review done.  updated. Immunizations reviewed & updated. Care Everywhere updated.  Second SHARYN to [facility] [place]

## 2023-09-12 ENCOUNTER — LAB VISIT (OUTPATIENT)
Dept: LAB | Facility: HOSPITAL | Age: 63
End: 2023-09-12
Attending: INTERNAL MEDICINE
Payer: COMMERCIAL

## 2023-09-12 DIAGNOSIS — E11.9 TYPE 2 DIABETES MELLITUS WITHOUT COMPLICATION, WITHOUT LONG-TERM CURRENT USE OF INSULIN: ICD-10-CM

## 2023-09-12 LAB
ALBUMIN SERPL BCP-MCNC: 4.2 G/DL (ref 3.5–5.2)
ALP SERPL-CCNC: 38 U/L (ref 55–135)
ALT SERPL W/O P-5'-P-CCNC: 16 U/L (ref 10–44)
ANION GAP SERPL CALC-SCNC: 11 MMOL/L (ref 8–16)
AST SERPL-CCNC: 22 U/L (ref 10–40)
BILIRUB SERPL-MCNC: 0.8 MG/DL (ref 0.1–1)
BUN SERPL-MCNC: 15 MG/DL (ref 6–20)
CALCIUM SERPL-MCNC: 9.3 MG/DL (ref 8.7–10.5)
CHLORIDE SERPL-SCNC: 102 MMOL/L (ref 95–110)
CO2 SERPL-SCNC: 24 MMOL/L (ref 23–29)
CREAT SERPL-MCNC: 1.2 MG/DL (ref 0.5–1.4)
EST. GFR  (NO RACE VARIABLE): >60 ML/MIN/1.73 M^2
ESTIMATED AVG GLUCOSE: 137 MG/DL (ref 68–131)
GLUCOSE SERPL-MCNC: 106 MG/DL (ref 70–110)
HBA1C MFR BLD: 6.4 % (ref 4–5.6)
POTASSIUM SERPL-SCNC: 3.6 MMOL/L (ref 3.5–5.1)
PROT SERPL-MCNC: 7.1 G/DL (ref 6–8.4)
SODIUM SERPL-SCNC: 137 MMOL/L (ref 136–145)

## 2023-09-12 PROCEDURE — 83036 HEMOGLOBIN GLYCOSYLATED A1C: CPT | Performed by: INTERNAL MEDICINE

## 2023-09-12 PROCEDURE — 80053 COMPREHEN METABOLIC PANEL: CPT | Performed by: INTERNAL MEDICINE

## 2023-09-12 PROCEDURE — 36415 COLL VENOUS BLD VENIPUNCTURE: CPT | Mod: PN | Performed by: INTERNAL MEDICINE

## 2023-09-18 ENCOUNTER — OFFICE VISIT (OUTPATIENT)
Dept: PRIMARY CARE CLINIC | Facility: CLINIC | Age: 63
End: 2023-09-18
Payer: COMMERCIAL

## 2023-09-18 VITALS
BODY MASS INDEX: 26.2 KG/M2 | HEART RATE: 83 BPM | OXYGEN SATURATION: 97 % | SYSTOLIC BLOOD PRESSURE: 120 MMHG | WEIGHT: 183 LBS | DIASTOLIC BLOOD PRESSURE: 80 MMHG | HEIGHT: 70 IN

## 2023-09-18 DIAGNOSIS — E11.9 TYPE 2 DIABETES MELLITUS WITHOUT COMPLICATION, WITHOUT LONG-TERM CURRENT USE OF INSULIN: Primary | ICD-10-CM

## 2023-09-18 DIAGNOSIS — Z00.00 ANNUAL PHYSICAL EXAM: ICD-10-CM

## 2023-09-18 DIAGNOSIS — I10 ESSENTIAL HYPERTENSION: ICD-10-CM

## 2023-09-18 DIAGNOSIS — Z30.09 VASECTOMY EVALUATION: ICD-10-CM

## 2023-09-18 DIAGNOSIS — C09.9 SQUAMOUS CELL CARCINOMA OF LEFT TONSIL: ICD-10-CM

## 2023-09-18 PROCEDURE — 99999 PR PBB SHADOW E&M-EST. PATIENT-LVL IV: ICD-10-PCS | Mod: PBBFAC,,, | Performed by: INTERNAL MEDICINE

## 2023-09-18 PROCEDURE — 3079F PR MOST RECENT DIASTOLIC BLOOD PRESSURE 80-89 MM HG: ICD-10-PCS | Mod: CPTII,S$GLB,, | Performed by: INTERNAL MEDICINE

## 2023-09-18 PROCEDURE — 99214 PR OFFICE/OUTPT VISIT, EST, LEVL IV, 30-39 MIN: ICD-10-PCS | Mod: S$GLB,,, | Performed by: INTERNAL MEDICINE

## 2023-09-18 PROCEDURE — 3008F PR BODY MASS INDEX (BMI) DOCUMENTED: ICD-10-PCS | Mod: CPTII,S$GLB,, | Performed by: INTERNAL MEDICINE

## 2023-09-18 PROCEDURE — 99999 PR PBB SHADOW E&M-EST. PATIENT-LVL IV: CPT | Mod: PBBFAC,,, | Performed by: INTERNAL MEDICINE

## 2023-09-18 PROCEDURE — 3074F PR MOST RECENT SYSTOLIC BLOOD PRESSURE < 130 MM HG: ICD-10-PCS | Mod: CPTII,S$GLB,, | Performed by: INTERNAL MEDICINE

## 2023-09-18 PROCEDURE — 3061F NEG MICROALBUMINURIA REV: CPT | Mod: CPTII,S$GLB,, | Performed by: INTERNAL MEDICINE

## 2023-09-18 PROCEDURE — 3066F PR DOCUMENTATION OF TREATMENT FOR NEPHROPATHY: ICD-10-PCS | Mod: CPTII,S$GLB,, | Performed by: INTERNAL MEDICINE

## 2023-09-18 PROCEDURE — 1159F MED LIST DOCD IN RCRD: CPT | Mod: CPTII,S$GLB,, | Performed by: INTERNAL MEDICINE

## 2023-09-18 PROCEDURE — 3008F BODY MASS INDEX DOCD: CPT | Mod: CPTII,S$GLB,, | Performed by: INTERNAL MEDICINE

## 2023-09-18 PROCEDURE — 99214 OFFICE O/P EST MOD 30 MIN: CPT | Mod: S$GLB,,, | Performed by: INTERNAL MEDICINE

## 2023-09-18 PROCEDURE — 3079F DIAST BP 80-89 MM HG: CPT | Mod: CPTII,S$GLB,, | Performed by: INTERNAL MEDICINE

## 2023-09-18 PROCEDURE — 4010F PR ACE/ARB THEARPY RXD/TAKEN: ICD-10-PCS | Mod: CPTII,S$GLB,, | Performed by: INTERNAL MEDICINE

## 2023-09-18 PROCEDURE — 4010F ACE/ARB THERAPY RXD/TAKEN: CPT | Mod: CPTII,S$GLB,, | Performed by: INTERNAL MEDICINE

## 2023-09-18 PROCEDURE — 3044F PR MOST RECENT HEMOGLOBIN A1C LEVEL <7.0%: ICD-10-PCS | Mod: CPTII,S$GLB,, | Performed by: INTERNAL MEDICINE

## 2023-09-18 PROCEDURE — 3061F PR NEG MICROALBUMINURIA RESULT DOCUMENTED/REVIEW: ICD-10-PCS | Mod: CPTII,S$GLB,, | Performed by: INTERNAL MEDICINE

## 2023-09-18 PROCEDURE — 3044F HG A1C LEVEL LT 7.0%: CPT | Mod: CPTII,S$GLB,, | Performed by: INTERNAL MEDICINE

## 2023-09-18 PROCEDURE — 1159F PR MEDICATION LIST DOCUMENTED IN MEDICAL RECORD: ICD-10-PCS | Mod: CPTII,S$GLB,, | Performed by: INTERNAL MEDICINE

## 2023-09-18 PROCEDURE — 3066F NEPHROPATHY DOC TX: CPT | Mod: CPTII,S$GLB,, | Performed by: INTERNAL MEDICINE

## 2023-09-18 PROCEDURE — 3074F SYST BP LT 130 MM HG: CPT | Mod: CPTII,S$GLB,, | Performed by: INTERNAL MEDICINE

## 2023-09-18 NOTE — PROGRESS NOTES
[facility] Note    Chief Complaint      Chief Complaint   Patient presents with    Diabetes     6 month        History of Present Illness      Cristhian Gregory is a 62 y.o. male with chronic conditions of DM2, HTN, SCC tonsil who presents today for: follow up chronic conditions.  DM2: A1C 6.4, down from 7.2. On metformin.  On lipitor for statin indication with diabetes.  Eye exam scheduled with Dr. Villa.  HTN: BP at goal on losartan, hctz.  Has been getting dehydrated in the hot weather.   Squamous cell carcinoma, left tonsil: Surveillance has been doing well.      Flu shot due.  TdAP 2019.  Shingles vaccine due age 62.  Pneumovax UTD.  Prevnar due age 67.  COVID vaccine UTD.  Cscope, Dr. Tejada, 2020, polyp, 5 yr interval.        Past Medical History:  Past Medical History:   Diagnosis Date    Diabetes mellitus, type 2     Hypertension        Past Surgical History:   has no past surgical history on file.    Family History:  family history includes Arthritis in his mother; Cancer in his mother; Diabetes in his mother; Heart disease in his father; No Known Problems in his brother and sister.     Social History:  Social History     Tobacco Use    Smoking status: Never    Smokeless tobacco: Never   Substance Use Topics    Alcohol use: Never    Drug use: Never       I personally reviewed all past medical, surgical, social and family history.    Review of Systems   Constitutional:  Negative for chills, fever and malaise/fatigue.   Respiratory:  Negative for shortness of breath.    Cardiovascular:  Negative for chest pain.   Gastrointestinal:  Negative for constipation, diarrhea, nausea and vomiting.   Skin:  Negative for rash.   Neurological:  Negative for weakness.   All other systems reviewed and are negative.       Medications:  Outpatient Encounter Medications as of 9/18/2023   Medication Sig Dispense Refill    atorvastatin (LIPITOR) 10 MG tablet TAKE 1 TABLET(10 MG) BY MOUTH EVERY DAY 90 tablet 3  "   blood sugar diagnostic (TRUE METRIX GLUCOSE TEST STRIP) Strp 1 strip by Misc.(Non-Drug; Combo Route) route 2 (two) times a day. 200 each 3    blood-glucose meter kit Use as instructed 1 each 0    hydroCHLOROthiazide (HYDRODIURIL) 12.5 MG Tab TAKE 1 TABLET(12.5 MG) BY MOUTH EVERY DAY 90 tablet 3    hydrocortisone 2.5 % cream Apply topically 2 (two) times daily. 20 g 3    lancets Misc 1 each by Misc.(Non-Drug; Combo Route) route once daily. 100 each 11    losartan (COZAAR) 100 MG tablet Take 1 tablet (100 mg total) by mouth once daily. 90 tablet 3    metFORMIN (GLUCOPHAGE) 500 MG tablet Take 1 tablet (500 mg total) by mouth 2 (two) times daily with meals. 180 tablet 0    tadalafiL (CIALIS) 20 MG Tab Take 1 tablet (20 mg total) by mouth daily as needed (30-60 min prior to intercourse). 30 tablet 2     No facility-administered encounter medications on file as of 9/18/2023.       Allergies:  Review of patient's allergies indicates:   Allergen Reactions    Ampicillin        Health Maintenance:  Immunization History   Administered Date(s) Administered    COVID-19, MRNA, LN-S, PF (MODERNA FULL 0.5 ML DOSE) 02/27/2021, 03/27/2021    Influenza 12/10/2021    Influenza - Quadrivalent - MDCK - PF 12/17/2021    Influenza - Quadrivalent - PF *Preferred* (6 months and older) 11/25/2019, 09/11/2020    Pneumococcal Polysaccharide - 23 Valent 03/02/2020    Tdap 11/25/2019      Health Maintenance   Topic Date Due    Shingles Vaccine (1 of 2) Never done    Eye Exam  06/26/2020    Foot Exam  05/17/2022    Low Dose Statin  01/26/2024    Lipid Panel  03/08/2024    Hemoglobin A1c  03/12/2024    Colorectal Cancer Screening  01/09/2025    TETANUS VACCINE  11/25/2029    Hepatitis C Screening  Completed        Physical Exam      Vital Signs  Pulse: 83  SpO2: 97 %  BP: 120/80  BP Location: Left arm  Patient Position: Sitting  Pain Score: 0-No pain  Height and Weight  Height: 5' 10" (177.8 cm)  Weight: 83 kg (182 lb 15.7 oz)  BSA (Calculated - " sq m): 2.02 sq meters  BMI (Calculated): 26.3  Weight in (lb) to have BMI = 25: 173.9]    Physical Exam  Vitals reviewed.   Constitutional:       Appearance: He is well-developed.   HENT:      Head: Normocephalic and atraumatic.      Right Ear: External ear normal.      Left Ear: External ear normal.   Cardiovascular:      Rate and Rhythm: Normal rate and regular rhythm.      Heart sounds: Normal heart sounds. No murmur heard.  Pulmonary:      Effort: Pulmonary effort is normal.      Breath sounds: Normal breath sounds. No wheezing or rales.   Abdominal:      General: Bowel sounds are normal.      Palpations: Abdomen is soft.          Laboratory:  CBC:  Recent Labs   Lab 11/19/21 0706 03/08/23  0915   WBC 4.14 5.28   RBC 4.88 5.02   Hemoglobin 14.0 14.5   Hematocrit 43.2 44.6   Platelets 217 266   MCV 89 89   MCH 28.7 28.9   MCHC 32.4 32.5     CMP:  Recent Labs   Lab 11/19/21  0706 03/08/23  0915 09/12/23  0726   Glucose 156 H 130 H 106   Calcium 9.6 10.0 9.3   Albumin 4.2 4.4 4.2   Total Protein 7.3 7.6 7.1   Sodium 138 139 137   Potassium 4.3 4.2 3.6   CO2 26 27 24   Chloride 102 103 102   BUN 14 21 H 15   Alkaline Phosphatase 58 38 L 38 L   ALT 23 18 16   AST 24 21 22   Total Bilirubin 0.5 1.0 0.8     URINALYSIS:       LIPIDS:  Recent Labs   Lab 11/19/21  0706 03/08/23  0915   TSH 2.477 1.907   HDL 37 L 37 L   Cholesterol 128 159   Triglycerides 144 148   LDL Cholesterol 62.2 L 92.4   HDL/Cholesterol Ratio 28.9 23.3   Non-HDL Cholesterol 91 122   Total Cholesterol/HDL Ratio 3.5 4.3     TSH:  Recent Labs   Lab 11/19/21  0706 03/08/23  0915   TSH 2.477 1.907     A1C:  Recent Labs   Lab 05/14/21  0720 11/19/21  0706 03/08/23  0915 09/12/23  0726   Hemoglobin A1C 7.4 H 7.2 H 7.2 H 6.4 H       Assessment/Plan     Cristhian Gregory is a 62 y.o.male with:    1. Type 2 diabetes mellitus without complication, without long-term current use of insulin  - Comprehensive Metabolic Panel; Future  - Hemoglobin A1C; Future  -  Microalbumin/Creatinine Ratio, Urine; Future  Continue current meds.  Eye exam requested.  2. Essential hypertension  Continue current meds.    3. Squamous cell carcinoma of left tonsil  Stable.  4. Annual physical exam  - CBC Auto Differential; Future  - Comprehensive Metabolic Panel; Future  - Hemoglobin A1C; Future  - Lipid Panel; Future  - Microalbumin/Creatinine Ratio, Urine; Future  - TSH; Future  - PSA, Screening; Future       Chronic conditions status updated as per HPI.  Other than changes above, cont current medications and maintain follow up with specialists.  Follow up in about 6 months (around 3/18/2024) for Annual preventative visit.    No future appointments.    Bethel Echols MD  [facility]

## 2023-09-25 ENCOUNTER — OFFICE VISIT (OUTPATIENT)
Dept: UROLOGY | Facility: CLINIC | Age: 63
End: 2023-09-25
Attending: UROLOGY
Payer: COMMERCIAL

## 2023-09-25 VITALS
BODY MASS INDEX: 26.65 KG/M2 | HEIGHT: 70 IN | SYSTOLIC BLOOD PRESSURE: 125 MMHG | OXYGEN SATURATION: 97 % | HEART RATE: 86 BPM | WEIGHT: 186.19 LBS | DIASTOLIC BLOOD PRESSURE: 77 MMHG | RESPIRATION RATE: 16 BRPM

## 2023-09-25 DIAGNOSIS — Z30.09 VASECTOMY EVALUATION: ICD-10-CM

## 2023-09-25 PROCEDURE — 4010F PR ACE/ARB THEARPY RXD/TAKEN: ICD-10-PCS | Mod: CPTII,S$GLB,, | Performed by: UROLOGY

## 2023-09-25 PROCEDURE — 3078F DIAST BP <80 MM HG: CPT | Mod: CPTII,S$GLB,, | Performed by: UROLOGY

## 2023-09-25 PROCEDURE — 3066F NEPHROPATHY DOC TX: CPT | Mod: CPTII,S$GLB,, | Performed by: UROLOGY

## 2023-09-25 PROCEDURE — 3061F PR NEG MICROALBUMINURIA RESULT DOCUMENTED/REVIEW: ICD-10-PCS | Mod: CPTII,S$GLB,, | Performed by: UROLOGY

## 2023-09-25 PROCEDURE — 3044F HG A1C LEVEL LT 7.0%: CPT | Mod: CPTII,S$GLB,, | Performed by: UROLOGY

## 2023-09-25 PROCEDURE — 3078F PR MOST RECENT DIASTOLIC BLOOD PRESSURE < 80 MM HG: ICD-10-PCS | Mod: CPTII,S$GLB,, | Performed by: UROLOGY

## 2023-09-25 PROCEDURE — 3074F SYST BP LT 130 MM HG: CPT | Mod: CPTII,S$GLB,, | Performed by: UROLOGY

## 2023-09-25 PROCEDURE — 1159F PR MEDICATION LIST DOCUMENTED IN MEDICAL RECORD: ICD-10-PCS | Mod: CPTII,S$GLB,, | Performed by: UROLOGY

## 2023-09-25 PROCEDURE — 4010F ACE/ARB THERAPY RXD/TAKEN: CPT | Mod: CPTII,S$GLB,, | Performed by: UROLOGY

## 2023-09-25 PROCEDURE — 3066F PR DOCUMENTATION OF TREATMENT FOR NEPHROPATHY: ICD-10-PCS | Mod: CPTII,S$GLB,, | Performed by: UROLOGY

## 2023-09-25 PROCEDURE — 3061F NEG MICROALBUMINURIA REV: CPT | Mod: CPTII,S$GLB,, | Performed by: UROLOGY

## 2023-09-25 PROCEDURE — 3008F PR BODY MASS INDEX (BMI) DOCUMENTED: ICD-10-PCS | Mod: CPTII,S$GLB,, | Performed by: UROLOGY

## 2023-09-25 PROCEDURE — 99204 OFFICE O/P NEW MOD 45 MIN: CPT | Mod: S$GLB,,, | Performed by: UROLOGY

## 2023-09-25 PROCEDURE — 1159F MED LIST DOCD IN RCRD: CPT | Mod: CPTII,S$GLB,, | Performed by: UROLOGY

## 2023-09-25 PROCEDURE — 3044F PR MOST RECENT HEMOGLOBIN A1C LEVEL <7.0%: ICD-10-PCS | Mod: CPTII,S$GLB,, | Performed by: UROLOGY

## 2023-09-25 PROCEDURE — 3074F PR MOST RECENT SYSTOLIC BLOOD PRESSURE < 130 MM HG: ICD-10-PCS | Mod: CPTII,S$GLB,, | Performed by: UROLOGY

## 2023-09-25 PROCEDURE — 3008F BODY MASS INDEX DOCD: CPT | Mod: CPTII,S$GLB,, | Performed by: UROLOGY

## 2023-09-25 PROCEDURE — 99204 PR OFFICE/OUTPT VISIT, NEW, LEVL IV, 45-59 MIN: ICD-10-PCS | Mod: S$GLB,,, | Performed by: UROLOGY

## 2023-09-25 RX ORDER — DIAZEPAM 5 MG/1
5 TABLET ORAL ONCE
Qty: 1 TABLET | Refills: 0 | Status: SHIPPED | OUTPATIENT
Start: 2023-09-25 | End: 2023-09-25

## 2023-09-25 NOTE — PROGRESS NOTES
Subjective:      Mr. Gregory is a 62 y.o.  male who presents for evaluation regarding vasectomy. He has 3 children, ages 33, 31, 29 yo, and he is certain that he desires no more. He is aware of other forms of contraception. He is aware that vasectomy is to be considered permanent/irreversible.    He denies orchalgia and history of testicular surgery or infection.    The following portions of the patient's history were reviewed and updated as appropriate: allergies, current medications, past family history, past medical history, past social history, past surgical history and problem list.    Review of Systems  Constitutional: no fever or chills  ENT: no nasal congestion or sore throat  Respiratory: no cough or shortness of breath  Cardiovascular: no chest pain or palpitations  Gastrointestinal: no nausea or vomiting, tolerating diet  Genitourinary: as per HPI  Hematologic/Lymphatic: no easy bruising or lymphadenopathy  Musculoskeletal: no arthralgias or myalgias  Neurological: no seizures or tremors  Behavioral/Psych: no auditory or visual hallucinations     Objective:   Vitals: There were no vitals taken for this visit.    Physical Exam   General: alert and oriented, no acute distress  Head: normocephalic, atraumatic  Neck: supple, no lymphadenopathy, normal ROM, no masses  Respiratory: Symmetric expansion, non-labored breathing  Cardiovascular: regular rate and rhythm, nomal pulses, no peripheral edema  Abdomen: soft, non tender, non distended, no palpable masses, no hernias, no hepatomegaly or splenomegaly  Genitourinary:   Penis: normal, no lesions, patent orthotopic meatus, no plaques  Scrotum: no rashes or skin changes;   Testes: Bilateral vas palpable and easily brought to anterior scrotum. Left epididymal cyst   Skin: normal coloration and turgor, no rashes, no suspicious skin lesions noted  Neuro: alert and oriented x3, no gross deficits  Psych: normal judgment and insight, normal mood/affect, and  non-anxious    Assessment:   Cristhian Gregory is a 62 y.o. male who presents for evaluation regarding vasectomy.    Plan:   I discussed with the patient risks and benefits, as well as alternatives. We discussed possible complications at length, including fever, infection, bleeding--possibly requiring reoperation,testicular injury or atrophy with loss of function, chronic pain, persistent or recurrent presence of sperm in the ejaculate, vasal recanalization, as well as the risks attendant to the anesthetic.  We discussed that he should stop aspirin, ibuprofen, and similar products at least one week prior to the procedure. Acetominophen is okay to use for headaches, pain, etc.  I recommended that he bring an athletic supporter on the day of the procedure.  We discussed that he must continue to use contraception for 3 months and until semen analyses reveals azoospermia.  He will schedule an elective vasectomy.  Prescription provided for valium with instructions to take upon arrival at office the day of the procedure.  Consent signed and scanned into chart.

## 2023-11-06 ENCOUNTER — PROCEDURE VISIT (OUTPATIENT)
Dept: UROLOGY | Facility: CLINIC | Age: 63
End: 2023-11-06
Attending: UROLOGY
Payer: COMMERCIAL

## 2023-11-06 VITALS
DIASTOLIC BLOOD PRESSURE: 88 MMHG | HEIGHT: 70 IN | OXYGEN SATURATION: 98 % | WEIGHT: 180.13 LBS | BODY MASS INDEX: 25.79 KG/M2 | SYSTOLIC BLOOD PRESSURE: 142 MMHG | RESPIRATION RATE: 17 BRPM | HEART RATE: 89 BPM | TEMPERATURE: 98 F

## 2023-11-06 DIAGNOSIS — Z30.09 VASECTOMY EVALUATION: ICD-10-CM

## 2023-11-06 PROCEDURE — 55250 VASECTOMY: ICD-10-PCS | Mod: S$GLB,,, | Performed by: UROLOGY

## 2023-11-06 PROCEDURE — 55250 REMOVAL OF SPERM DUCT(S): CPT | Mod: S$GLB,,, | Performed by: UROLOGY

## 2023-11-06 RX ORDER — LIDOCAINE HYDROCHLORIDE AND EPINEPHRINE 20; 10 MG/ML; UG/ML
1 INJECTION, SOLUTION INFILTRATION; PERINEURAL
Status: COMPLETED | OUTPATIENT
Start: 2023-11-06 | End: 2023-11-06

## 2023-11-06 RX ADMIN — LIDOCAINE HYDROCHLORIDE AND EPINEPHRINE 4 ML: 20; 10 INJECTION, SOLUTION INFILTRATION; PERINEURAL at 11:11

## 2023-11-06 NOTE — PROCEDURES
"Vasectomy    Date/Time: 11/6/2023 10:00 AM    Performed by: Anish Madrid MD  Authorized by: Anish Madrid MD    Consent Done?:  Yes (Written)  Time out: Immediately prior to procedure a "time out" was called to verify the correct patient, procedure, equipment, support staff and site/side marked as required.    Indications:  [place] male  Position:  Dorsal lithotomy  Anesthesia:  Other (5cc 1% lidocaine)  Patient sedated: No    Preparation: Patient was prepped and draped in usual sterile fashion    Incisions:  1  Length vas excised:  2.5 cm  Vas:  Fulgurated and Buried  Sutures:  3-0 chromic SH (for fascial interposition)  Skin closures:  3-0 chromic SH  Same procedure performed on both sides    Patient tolerance:  Patient tolerated the procedure well with no immediate complications     Post-Procedure Care:  -- No heavy lifting for 5-7 days  -- No intercourse for 3-4 days  -- OK to shower tomorrow; no soaking in water for 7 days  -- Call for severe pain, bleeding, concern for infection    FU in 3 months for semen analysis.    "

## 2023-11-14 RX ORDER — LOSARTAN POTASSIUM 100 MG/1
100 TABLET ORAL
Qty: 90 TABLET | Refills: 0 | Status: SHIPPED | OUTPATIENT
Start: 2023-11-14

## 2023-11-14 NOTE — TELEPHONE ENCOUNTER
No care due was identified.  Health [facility] Embedded Care Due Messages. Reference number: [id number].   11/14/2023 7:08:50 AM CST

## 2023-11-14 NOTE — TELEPHONE ENCOUNTER
Refill Routing Note   Medication(s) are not appropriate for processing by [facility] for the following reason(s):        Required vitals abnormal    [facility] action(s):  Defer               Appointments  past 12m or future 3m with PCP    Date Provider   Last Visit   9/18/2023 Bethel Echols MD   Next Visit   3/18/2024 Bethel Echols MD   ED visits in past 90 days: 0        Note composed:12:39 PM 11/14/2023

## 2023-11-17 DIAGNOSIS — E11.9 TYPE 2 DIABETES MELLITUS WITHOUT COMPLICATION, WITHOUT LONG-TERM CURRENT USE OF INSULIN: ICD-10-CM

## 2023-11-17 RX ORDER — METFORMIN HYDROCHLORIDE 500 MG/1
500 TABLET ORAL 2 TIMES DAILY WITH MEALS
Qty: 180 TABLET | Refills: 0 | Status: SHIPPED | OUTPATIENT
Start: 2023-11-17 | End: 2024-02-12

## 2023-11-17 RX ORDER — ATORVASTATIN CALCIUM 10 MG/1
10 TABLET, FILM COATED ORAL DAILY
Qty: 90 TABLET | Refills: 3 | Status: SHIPPED | OUTPATIENT
Start: 2023-11-17

## 2023-11-17 RX ORDER — HYDROCHLOROTHIAZIDE 12.5 MG/1
12.5 TABLET ORAL DAILY
Qty: 90 TABLET | Refills: 3 | Status: SHIPPED | OUTPATIENT
Start: 2023-11-17

## 2023-11-17 NOTE — TELEPHONE ENCOUNTER
No care due was identified.  [facility] Embedded Care Due Messages. Reference number: [id number].   11/17/2023 9:00:25 AM CST

## 2024-02-05 ENCOUNTER — CLINICAL SUPPORT (OUTPATIENT)
Dept: UROLOGY | Facility: CLINIC | Age: 64
End: 2024-02-05
Payer: COMMERCIAL

## 2024-02-05 ENCOUNTER — PATIENT MESSAGE (OUTPATIENT)
Dept: UROLOGY | Facility: CLINIC | Age: 64
End: 2024-02-05

## 2024-02-05 DIAGNOSIS — Z30.09 VASECTOMY EVALUATION: Primary | ICD-10-CM

## 2024-02-05 NOTE — PROGRESS NOTES
Cristhian Gregory is a 63 y.o. male status post vasectomy on 11/6/23 here for post-vasectomy SA.     Semen Microscopy   Uncentrifuged fresh semen sample examined under LP and HP microscopy.  No sperm noted in sample.    Patient notified in MyChart.

## 2024-02-12 DIAGNOSIS — E11.9 TYPE 2 DIABETES MELLITUS WITHOUT COMPLICATION, WITHOUT LONG-TERM CURRENT USE OF INSULIN: ICD-10-CM

## 2024-02-12 RX ORDER — METFORMIN HYDROCHLORIDE 500 MG/1
500 TABLET ORAL 2 TIMES DAILY WITH MEALS
Qty: 180 TABLET | Refills: 3 | Status: SHIPPED | OUTPATIENT
Start: 2024-02-12

## 2024-03-14 ENCOUNTER — LAB VISIT (OUTPATIENT)
Dept: LAB | Facility: HOSPITAL | Age: 64
End: 2024-03-14
Attending: INTERNAL MEDICINE
Payer: COMMERCIAL

## 2024-03-14 DIAGNOSIS — E11.9 TYPE 2 DIABETES MELLITUS WITHOUT COMPLICATION, WITHOUT LONG-TERM CURRENT USE OF INSULIN: ICD-10-CM

## 2024-03-14 DIAGNOSIS — Z00.00 ANNUAL PHYSICAL EXAM: ICD-10-CM

## 2024-03-14 LAB
ALBUMIN SERPL BCP-MCNC: 4.2 G/DL (ref 3.5–5.2)
ALP SERPL-CCNC: 54 U/L (ref 55–135)
ALT SERPL W/O P-5'-P-CCNC: 19 U/L (ref 10–44)
ANION GAP SERPL CALC-SCNC: 9 MMOL/L (ref 8–16)
AST SERPL-CCNC: 20 U/L (ref 10–40)
BASOPHILS # BLD AUTO: 0.01 K/UL (ref 0–0.2)
BASOPHILS NFR BLD: 0.2 % (ref 0–1.9)
BILIRUB SERPL-MCNC: 0.6 MG/DL (ref 0.1–1)
BUN SERPL-MCNC: 19 MG/DL (ref 8–23)
CALCIUM SERPL-MCNC: 9.6 MG/DL (ref 8.7–10.5)
CHLORIDE SERPL-SCNC: 103 MMOL/L (ref 95–110)
CHOLEST SERPL-MCNC: 201 MG/DL (ref 120–199)
CHOLEST/HDLC SERPL: 5.4 {RATIO} (ref 2–5)
CO2 SERPL-SCNC: 28 MMOL/L (ref 23–29)
COMPLEXED PSA SERPL-MCNC: 0.74 NG/ML (ref 0–4)
CREAT SERPL-MCNC: 1.2 MG/DL (ref 0.5–1.4)
DIFFERENTIAL METHOD BLD: NORMAL
EOSINOPHIL # BLD AUTO: 0.1 K/UL (ref 0–0.5)
EOSINOPHIL NFR BLD: 1.9 % (ref 0–8)
ERYTHROCYTE [DISTWIDTH] IN BLOOD BY AUTOMATED COUNT: 13.2 % (ref 11.5–14.5)
EST. GFR  (NO RACE VARIABLE): >60 ML/MIN/1.73 M^2
ESTIMATED AVG GLUCOSE: 160 MG/DL (ref 68–131)
GLUCOSE SERPL-MCNC: 136 MG/DL (ref 70–110)
HBA1C MFR BLD: 7.2 % (ref 4–5.6)
HCT VFR BLD AUTO: 44.4 % (ref 40–54)
HDLC SERPL-MCNC: 37 MG/DL (ref 40–75)
HDLC SERPL: 18.4 % (ref 20–50)
HGB BLD-MCNC: 14.6 G/DL (ref 14–18)
IMM GRANULOCYTES # BLD AUTO: 0.01 K/UL (ref 0–0.04)
IMM GRANULOCYTES NFR BLD AUTO: 0.2 % (ref 0–0.5)
LDLC SERPL CALC-MCNC: 130.8 MG/DL (ref 63–159)
LYMPHOCYTES # BLD AUTO: 1.5 K/UL (ref 1–4.8)
LYMPHOCYTES NFR BLD: 32.5 % (ref 18–48)
MCH RBC QN AUTO: 29.4 PG (ref 27–31)
MCHC RBC AUTO-ENTMCNC: 32.9 G/DL (ref 32–36)
MCV RBC AUTO: 90 FL (ref 82–98)
MONOCYTES # BLD AUTO: 0.5 K/UL (ref 0.3–1)
MONOCYTES NFR BLD: 11.4 % (ref 4–15)
NEUTROPHILS # BLD AUTO: 2.5 K/UL (ref 1.8–7.7)
NEUTROPHILS NFR BLD: 53.8 % (ref 38–73)
NONHDLC SERPL-MCNC: 164 MG/DL
NRBC BLD-RTO: 0 /100 WBC
PLATELET # BLD AUTO: 208 K/UL (ref 150–450)
PMV BLD AUTO: 10.6 FL (ref 9.2–12.9)
POTASSIUM SERPL-SCNC: 3.9 MMOL/L (ref 3.5–5.1)
PROT SERPL-MCNC: 7.1 G/DL (ref 6–8.4)
RBC # BLD AUTO: 4.96 M/UL (ref 4.6–6.2)
SODIUM SERPL-SCNC: 140 MMOL/L (ref 136–145)
TRIGL SERPL-MCNC: 166 MG/DL (ref 30–150)
TSH SERPL DL<=0.005 MIU/L-ACNC: 2.36 UIU/ML (ref 0.4–4)
WBC # BLD AUTO: 4.64 K/UL (ref 3.9–12.7)

## 2024-03-14 PROCEDURE — 83036 HEMOGLOBIN GLYCOSYLATED A1C: CPT | Performed by: INTERNAL MEDICINE

## 2024-03-14 PROCEDURE — 80061 LIPID PANEL: CPT | Performed by: INTERNAL MEDICINE

## 2024-03-14 PROCEDURE — 85025 COMPLETE CBC W/AUTO DIFF WBC: CPT | Performed by: INTERNAL MEDICINE

## 2024-03-14 PROCEDURE — 84153 ASSAY OF PSA TOTAL: CPT | Performed by: INTERNAL MEDICINE

## 2024-03-14 PROCEDURE — 84443 ASSAY THYROID STIM HORMONE: CPT | Performed by: INTERNAL MEDICINE

## 2024-03-14 PROCEDURE — 36415 COLL VENOUS BLD VENIPUNCTURE: CPT | Mod: PN | Performed by: INTERNAL MEDICINE

## 2024-03-14 PROCEDURE — 80053 COMPREHEN METABOLIC PANEL: CPT | Performed by: INTERNAL MEDICINE

## 2024-03-18 ENCOUNTER — OFFICE VISIT (OUTPATIENT)
Dept: PRIMARY CARE CLINIC | Facility: CLINIC | Age: 64
End: 2024-03-18
Payer: COMMERCIAL

## 2024-03-18 VITALS
BODY MASS INDEX: 28.15 KG/M2 | HEIGHT: 70 IN | DIASTOLIC BLOOD PRESSURE: 86 MMHG | WEIGHT: 196.63 LBS | OXYGEN SATURATION: 95 % | SYSTOLIC BLOOD PRESSURE: 120 MMHG | HEART RATE: 86 BPM

## 2024-03-18 DIAGNOSIS — E78.2 HYPERLIPIDEMIA, MIXED: ICD-10-CM

## 2024-03-18 DIAGNOSIS — I10 ESSENTIAL HYPERTENSION: ICD-10-CM

## 2024-03-18 DIAGNOSIS — H93.19 TINNITUS, UNSPECIFIED LATERALITY: ICD-10-CM

## 2024-03-18 DIAGNOSIS — Z00.00 ANNUAL PHYSICAL EXAM: ICD-10-CM

## 2024-03-18 DIAGNOSIS — C09.9 SQUAMOUS CELL CARCINOMA OF LEFT TONSIL: ICD-10-CM

## 2024-03-18 DIAGNOSIS — E11.9 TYPE 2 DIABETES MELLITUS WITHOUT COMPLICATION, WITHOUT LONG-TERM CURRENT USE OF INSULIN: Primary | ICD-10-CM

## 2024-03-18 PROCEDURE — 3008F BODY MASS INDEX DOCD: CPT | Mod: CPTII,S$GLB,, | Performed by: INTERNAL MEDICINE

## 2024-03-18 PROCEDURE — 4010F ACE/ARB THERAPY RXD/TAKEN: CPT | Mod: CPTII,S$GLB,, | Performed by: INTERNAL MEDICINE

## 2024-03-18 PROCEDURE — 3061F NEG MICROALBUMINURIA REV: CPT | Mod: CPTII,S$GLB,, | Performed by: INTERNAL MEDICINE

## 2024-03-18 PROCEDURE — 1159F MED LIST DOCD IN RCRD: CPT | Mod: CPTII,S$GLB,, | Performed by: INTERNAL MEDICINE

## 2024-03-18 PROCEDURE — 3066F NEPHROPATHY DOC TX: CPT | Mod: CPTII,S$GLB,, | Performed by: INTERNAL MEDICINE

## 2024-03-18 PROCEDURE — 3051F HG A1C>EQUAL 7.0%<8.0%: CPT | Mod: CPTII,S$GLB,, | Performed by: INTERNAL MEDICINE

## 2024-03-18 PROCEDURE — 99396 PREV VISIT EST AGE 40-64: CPT | Mod: S$GLB,,, | Performed by: INTERNAL MEDICINE

## 2024-03-18 PROCEDURE — 3079F DIAST BP 80-89 MM HG: CPT | Mod: CPTII,S$GLB,, | Performed by: INTERNAL MEDICINE

## 2024-03-18 PROCEDURE — 3074F SYST BP LT 130 MM HG: CPT | Mod: CPTII,S$GLB,, | Performed by: INTERNAL MEDICINE

## 2024-03-18 PROCEDURE — 99999 PR PBB SHADOW E&M-EST. PATIENT-LVL IV: CPT | Mod: PBBFAC,,, | Performed by: INTERNAL MEDICINE

## 2024-03-18 NOTE — PROGRESS NOTES
[facility] Note    Chief Complaint      Chief Complaint   Patient presents with    Annual Exam       History of Present Illness      Cristhian Gregory is a 63 y.o. male with chronic conditions of DM2, HTN, SCC tonsil who presents today for: annual preventative visit.  Reports continue to progress through the grieving process after his wife passed in 2022.  Not interested in trying any medications.  Reports ringing in the ears with slight decreased hearing.  DM2: A1C 7.2, up from 6.4. Has been eating more cookies and bread.  On metformin.  On lipitor for statin indication with diabetes.  Eye exam scheduled with Dr. Villa.  HTN: BP at goal on losartan, hctz.    Squamous cell carcinoma, left tonsil: Surveillance has been doing well.      Flu shot missed this season.  TdAP 2019.  Shingles vaccine due.  Pneumovax UTD.  Prevnar due age 67.  COVID vaccine UTD.  Cscope, Dr. Tejada, 2020, polyp, 5 yr interval.      Past Medical History:  Past Medical History:   Diagnosis Date    Diabetes mellitus, type 2     Hypertension        Past Surgical History:   has no past surgical history on file.    Family History:  family history includes Arthritis in his mother; Cancer in his mother; Diabetes in his mother; Heart disease in his father; No Known Problems in his brother and sister.     Social History:  Social History     Tobacco Use    Smoking status: Never    Smokeless tobacco: Never   Substance Use Topics    Alcohol use: Not Currently    Drug use: Not Currently       I personally reviewed all past medical, surgical, social and family history.    Review of Systems   Constitutional:  Negative for chills, fever and malaise/fatigue.   Respiratory:  Negative for shortness of breath.    Cardiovascular:  Negative for chest pain.   Gastrointestinal:  Negative for constipation, diarrhea, nausea and vomiting.   Skin:  Negative for rash.   Neurological:  Negative for weakness.   All other systems reviewed and are  negative.       Medications:  Outpatient Encounter Medications as of 3/18/2024   Medication Sig Dispense Refill    atorvastatin (LIPITOR) 10 MG tablet Take 1 tablet (10 mg total) by mouth once daily. 90 tablet 3    blood sugar diagnostic (TRUE METRIX GLUCOSE TEST STRIP) Strp 1 strip by Misc.(Non-Drug; Combo Route) route 2 (two) times a day. 200 each 3    blood-glucose meter kit Use as instructed 1 each 0    diazePAM (VALIUM) 5 MG tablet Take 1 tablet (5 mg total) by mouth once. Take the day of prior to coming in for procedure. for 1 dose 1 tablet 0    hydroCHLOROthiazide (HYDRODIURIL) 12.5 MG Tab Take 1 tablet (12.5 mg total) by mouth once daily. 90 tablet 3    hydrocortisone 2.5 % cream Apply topically 2 (two) times daily. (Patient not taking: Reported on 11/6/2023) 20 g 3    lancets Misc 1 each by Misc.(Non-Drug; Combo Route) route once daily. 100 each 11    losartan (COZAAR) 100 MG tablet TAKE 1 TABLET(100 MG) BY MOUTH EVERY DAY 90 tablet 0    metFORMIN (GLUCOPHAGE) 500 MG tablet TAKE 1 TABLET(500 MG) BY MOUTH TWICE DAILY WITH MEALS 180 tablet 3    tadalafiL (CIALIS) 20 MG Tab Take 1 tablet (20 mg total) by mouth daily as needed (30-60 min prior to intercourse). 30 tablet 2     No facility-administered encounter medications on file as of 3/18/2024.       Allergies:  Review of patient's allergies indicates:   Allergen Reactions    Ampicillin        Health Maintenance:  Immunization History   Administered Date(s) Administered    COVID-19, MRNA, LN-S, PF (MODERNA FULL 0.5 ML DOSE) 02/27/2021, 03/27/2021    Influenza 12/10/2021    Influenza - Quadrivalent - MDCK - PF 12/17/2021    Influenza - Quadrivalent - PF *Preferred* (6 months and older) 11/25/2019, 09/11/2020    Pneumococcal Polysaccharide - 23 Valent 03/02/2020    Tdap 11/25/2019      Health Maintenance   Topic Date Due    Shingles Vaccine (1 of 2) Never done    Eye Exam  06/26/2020    Foot Exam  05/17/2022    Hemoglobin A1c  09/14/2024    Low Dose Statin   "11/17/2024    Colorectal Cancer Screening  01/09/2025    Lipid Panel  03/14/2025    TETANUS VACCINE  11/25/2029    Hepatitis C Screening  Completed        Physical Exam      Vital Signs  Pulse: 86  SpO2: 95 %  BP: 120/86  BP Location: Left arm  Patient Position: Sitting  Pain Score: 0-No pain  Height and Weight  Height: 5' 10" (177.8 cm)  Weight: 89.2 kg (196 lb 10.4 oz)  BSA (Calculated - sq m): 2.1 sq meters  BMI (Calculated): 28.2  Weight in (lb) to have BMI = 25: 173.9]    Physical Exam  Vitals reviewed.   Constitutional:       Appearance: He is well-developed.   HENT:      Head: Normocephalic and atraumatic.      Right Ear: External ear normal.      Left Ear: External ear normal.   Cardiovascular:      Rate and Rhythm: Normal rate and regular rhythm.      Heart sounds: Normal heart sounds. No murmur heard.  Pulmonary:      Effort: Pulmonary effort is normal.      Breath sounds: Normal breath sounds. No wheezing or rales.   Abdominal:      General: Bowel sounds are normal.      Palpations: Abdomen is soft.          Laboratory:  CBC:  Recent Labs   Lab 11/19/21  0706 03/08/23  0915 03/14/24  0730   WBC 4.14 5.28 4.64   RBC 4.88 5.02 4.96   Hemoglobin 14.0 14.5 14.6   Hematocrit 43.2 44.6 44.4   Platelets 217 266 208   MCV 89 89 90   MCH 28.7 28.9 29.4   MCHC 32.4 32.5 32.9     CMP:  Recent Labs   Lab 03/08/23  0915 09/12/23  0726 03/14/24  0730   Glucose 130 H 106 136 H   Calcium 10.0 9.3 9.6   Albumin 4.4 4.2 4.2   Total Protein 7.6 7.1 7.1   Sodium 139 137 140   Potassium 4.2 3.6 3.9   CO2 27 24 28   Chloride 103 102 103   BUN 21 H 15 19   Alkaline Phosphatase 38 L 38 L 54 L   ALT 18 16 19   AST 21 22 20   Total Bilirubin 1.0 0.8 0.6     URINALYSIS:       LIPIDS:  Recent Labs   Lab 11/19/21  0706 03/08/23  0915 03/14/24  0730   TSH 2.477 1.907 2.362   HDL 37 L 37 L 37 L   Cholesterol 128 159 201 H   Triglycerides 144 148 166 H   LDL Cholesterol 62.2 L 92.4 130.8   HDL/Cholesterol Ratio 28.9 23.3 18.4 L "   Non-HDL Cholesterol 91 122 164   Total Cholesterol/HDL Ratio 3.5 4.3 5.4 H     TSH:  Recent Labs   Lab 11/19/21  0706 03/08/23  0915 03/14/24  0730   TSH 2.477 1.907 2.362     A1C:  Recent Labs   Lab 05/14/21  0720 11/19/21  0706 03/08/23  0915 09/12/23  0726 03/14/24  0730   Hemoglobin A1C 7.4 H 7.2 H 7.2 H 6.4 H 7.2 H       Assessment/Plan     Cristhian Gregory is a 63 y.o.male with:    1. Annual physical exam  Discussed diet and exercise, vaccines and cancer screening, risk factors.  Screening labs reviewed.     2. Type 2 diabetes mellitus without complication, without long-term current use of insulin  - Comprehensive Metabolic Panel; Future  - Hemoglobin A1C; Future  Continue current meds.  Reviewed labs.  Eye exam UTD< repeat scheduled.  3. Essential hypertension  Continue current meds.    4. Squamous cell carcinoma of left tonsil  F/U with ENT for surveillance.   5. Hyperlipidemia, mixed  - Lipid Panel; Future    6. Tinnitus, unspecified laterality  - Ambulatory referral/consult to Audiology; Future       Chronic conditions status updated as per HPI.  Other than changes above, cont current medications and maintain follow up with specialists.  Follow up in about 6 months (around 9/18/2024) for Follow up visit.    Future Appointments   Date Time Provider Department Center   9/19/2024  7:30 AM LAB, [facility] LAB [facility]   9/23/2024  9:45 AM Bethel Echols MD [facility] [place] operated [facility]       Bethel Echols MD  [facility]

## 2024-03-21 ENCOUNTER — CLINICAL SUPPORT (OUTPATIENT)
Dept: AUDIOLOGY | Facility: CLINIC | Age: 64
End: 2024-03-21
Payer: COMMERCIAL

## 2024-03-21 DIAGNOSIS — H93.13 TINNITUS OF BOTH EARS: ICD-10-CM

## 2024-03-21 DIAGNOSIS — H93.19 TINNITUS, UNSPECIFIED LATERALITY: ICD-10-CM

## 2024-03-21 DIAGNOSIS — H90.A22 SENSORINEURAL HEARING LOSS (SNHL) OF LEFT EAR WITH RESTRICTED HEARING OF RIGHT EAR: Primary | ICD-10-CM

## 2024-03-21 PROCEDURE — 92557 COMPREHENSIVE HEARING TEST: CPT | Mod: S$GLB,,, | Performed by: AUDIOLOGIST

## 2024-03-21 PROCEDURE — 99999 PR PBB SHADOW E&M-EST. PATIENT-LVL II: CPT | Mod: PBBFAC,,, | Performed by: AUDIOLOGIST

## 2024-03-21 PROCEDURE — 92567 TYMPANOMETRY: CPT | Mod: S$GLB,,, | Performed by: AUDIOLOGIST

## 2024-03-21 NOTE — PROGRESS NOTES
Cristhian Gregory was seen today in the clinic for an audiologic evaluation.  Patient's main complaint was tinnitus in both ears.  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 He also reported a history of radiation treatment for cancer on the left side of his head. Mr. Gregory reported that he has worn hearing protection for the past 15-20 years, including when doing yard work at home. He denied any history of head injuries, falls, or migraines.     Otoscopy revealed clear EAC's with visible TM's in both ears.    Tympanometry revealed Type A in the right ear and Type A in the left ear.     Audiogram results revealed mostly normal hearing with a mild hearing loss at 6000 Hz in the right ear and normal hearing with a moderate rising to mild sensorineural hearing loss (SNHL) in the left ear.      Speech reception thresholds were noted at 15 dB in the right ear and 10 dB in the left ear.    Speech discrimination scores were 100% in the right ear and 80% in the left ear. Of note: left speech score was tested only 5 dB suprathreshold for some of Mr. Gregory's high frequency thresholds and was completed prior to pure tone testing. Score to likely improve with more amplification.     Results were discussed with Mr. Gregory following testing; we discussed realistic expectations for a left hearing aid. We discussed the benefits of amplification with tinnitus as well as hearing protection. It is recommended Mr. Gregory follow-up with ENT regarding the significant asymmetry.     Recommendations:  Otologic evaluation with ENT  Annual audiogram  Hearing protection when in noise  Hearing aid consultation for left hearing aid if desired by patient following medical clearance

## 2024-03-21 NOTE — Clinical Note
Danrde Hugo,  Mr. 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 I recommend he see ENT. I know he sees someone annually but wanted to forward to you as well.   Thanks for the referral!  Ольга

## 2024-04-03 DIAGNOSIS — E11.9 TYPE 2 DIABETES MELLITUS WITHOUT COMPLICATION, UNSPECIFIED WHETHER LONG TERM INSULIN USE: ICD-10-CM

## 2024-04-10 ENCOUNTER — PATIENT MESSAGE (OUTPATIENT)
Dept: ADMINISTRATIVE | Facility: HOSPITAL | Age: 64
End: 2024-04-10
Payer: COMMERCIAL

## 2024-05-01 RX ORDER — LOSARTAN POTASSIUM 100 MG/1
100 TABLET ORAL DAILY
Qty: 90 TABLET | Refills: 3 | Status: SHIPPED | OUTPATIENT
Start: 2024-05-01

## 2024-05-01 NOTE — TELEPHONE ENCOUNTER
No care due was identified.  Health [facility] Embedded Care Due Messages. Reference number: [id number].   5/01/2024 12:01:41 PM CDT

## 2024-05-02 NOTE — TELEPHONE ENCOUNTER
Refill Decision Note   Cristhian Gregory  is requesting a refill authorization.  Brief Assessment and Rationale for Refill:  Approve     Medication Therapy Plan:         Comments:     Note composed:11:51 PM 05/01/2024

## 2024-05-20 NOTE — TELEPHONE ENCOUNTER
Care Due:                  Date            Visit Type   Department     Provider  --------------------------------------------------------------------------------                                ESTABLISHED                              PATIENT      [facility]  Last Visit: 11-      [facility]           Bethel Marrufo  Next Visit: None Scheduled  None         None Found                                                            Last  Test          Frequency    Reason                     Performed    Due Date  --------------------------------------------------------------------------------    HBA1C.......  6 months...  metFORMIN................  11- 05-    Powered by [employer] Reference number: [id number].   4/06/2022 8:42:26 AM CDT   ED Provider Note  [facility]      History     Chief Complaint   Patient presents with    Suicidal     Pt states she wants to stab herself in the chest with a pen     HPI  Sadaf Ross is a 26 year old female with a past medical history of borderline personality disorder, depression, bipolar disorder, ADHD, chronic suicidal ideation who presents to the emergency department almost immediately after having been discharged. She reportedly made statements to EMS officials that she wants to stab herself in the chest with a pen. This is the patient's 8th ED visit within the past week.  Patient does not delineate any specific stressor she was seen earlier today with a question of possible blood in her stools with a negative evaluation.  Patient remained here in the emergency room called and was requesting discharge back to her group home again.    Past Medical History  Past Medical History:   Diagnosis Date    ADHD (attention deficit hyperactivity disorder)     Bipolar 1 disorder (H)     Borderline personality disorder (H)     Depressive disorder     Intellectual disability     Obesity     Syncope      Past Surgical History:   Procedure Laterality Date    APPENDECTOMY       acetaminophen (TYLENOL) 325 MG tablet  ARIPiprazole lauroxil ER (ARISTADA) 882 MG/3.2ML intra-muscular  bisacodyl (DULCOLAX) 5 MG EC tablet  cloNIDine (CATAPRES) 0.1 MG tablet  dicyclomine (BENTYL) 20 MG tablet  divalproex sodium extended-release (DEPAKOTE ER) 250 MG 24 hr tablet  docusate sodium (COLACE) 50 MG capsule  famotidine (PEPCID) 20 MG tablet  FLUoxetine (PROZAC) 40 MG capsule  hydrOXYzine (ATARAX) 50 MG tablet  loperamide (IMODIUM A-D) 2 MG tablet  OLANZapine zydis (ZYPREXA) 5 MG ODT  ondansetron (ZOFRAN) 4 MG tablet  pantoprazole (PROTONIX) 40 MG EC tablet  promethazine (PHENERGAN) 12.5 MG tablet  senna-docusate (SENOKOT-S/PERICOLACE) 8.6-50 MG tablet  traZODone (DESYREL) 50 MG tablet  Vitamin D,  Cholecalciferol, 25 MCG (1000 UT) TABS      Allergies   Allergen Reactions    Penicillin G     Penicillins Rash and Unknown     Family History  Family History   Problem Relation Age of Onset    Diabetes Type 1 Father     Cancer Paternal Grandfather      Social History   Social History     Tobacco Use    Smoking status: Some Days     Current packs/day: 0.25     Average packs/day: 0.3 packs/day for 5.0 years (1.3 ttl pk-yrs)     Types: Cigarettes, Vaping Device    Smokeless tobacco: Former   Substance Use Topics    Alcohol use: No    Drug use: No      Past medical history, past surgical history, medications, allergies, family history, and social history were reviewed with the patient. No additional pertinent items.      A medically appropriate review of systems was performed with pertinent positives and negatives noted in the HPI, and all other systems negative.    Physical Exam   BP:  (pt refused)  Physical Exam  Constitutional:       General: She is not in acute distress.     Appearance: Normal appearance. She is not diaphoretic.   HENT:      Head: Atraumatic.      Mouth/Throat:      Mouth: Mucous membranes are moist.   Eyes:      General: No scleral icterus.     Conjunctiva/sclera: Conjunctivae normal.   Cardiovascular:      Rate and Rhythm: Normal rate.      Heart sounds: Normal heart sounds.   Pulmonary:      Effort: No respiratory distress.      Breath sounds: Normal breath sounds.   Abdominal:      General: Abdomen is flat.   Musculoskeletal:      Cervical back: Neck supple.   Skin:     General: Skin is warm.      Findings: No rash.   Neurological:      General: No focal deficit present.      Mental Status: She is alert and oriented to person, place, and time.      Sensory: No sensory deficit.      Motor: No weakness.      Coordination: Coordination normal.   Psychiatric:      Comments: Patient was initially agitated and anxious however called while here in the emergency room requesting discharge at this time.   Patient denies any intent to harm herself or others.           ED Course, Procedures, & Data      Procedures          No results found for any visits on 05/20/24.  Medications   OLANZapine zydis (zyPREXA) ODT tab 10 mg (10 mg Oral Not Given 5/20/24 1553)     Labs Ordered and Resulted from Time of ED Arrival to Time of ED Departure - No data to display  No orders to display          Critical care was not performed.     Medical Decision Making  The patient's presentation was of moderate complexity (a chronic illness mild to moderate exacerbation, progression, or side effect of treatment).    The patient's evaluation involved:  history and exam without other MDM data elements    The patient's management necessitated moderate risk (prescription drug management including medications given in the ED).    Assessment & Plan        I have reviewed the nursing notes. I have reviewed the findings, diagnosis, plan and need for follow up with the patient.        Final diagnoses:   Borderline personality disorder (H)   Intellectual disability   I, Sergio Sargent, am serving as a trained medical scribe to document services personally performed by Robert Olea MD, based on the provider's statements to me.     IRobert MD, was physically present and have reviewed and verified the accuracy of this note documented by Sergio Sargent.      Robert Olea MD  [facility] EMERGENCY DEPARTMENT  5/20/2024     Robert Olea MD  05/22/24 3703

## 2024-08-09 DIAGNOSIS — N52.9 ERECTILE DYSFUNCTION, UNSPECIFIED ERECTILE DYSFUNCTION TYPE: ICD-10-CM

## 2024-08-09 RX ORDER — TADALAFIL 20 MG/1
20 TABLET ORAL DAILY PRN
Qty: 30 TABLET | Refills: 2 | Status: CANCELLED | OUTPATIENT
Start: 2024-08-09 | End: 2025-08-09

## 2024-08-13 ENCOUNTER — PATIENT MESSAGE (OUTPATIENT)
Dept: PRIMARY CARE CLINIC | Facility: CLINIC | Age: 64
End: 2024-08-13
Payer: COMMERCIAL

## 2024-08-13 DIAGNOSIS — N52.9 ERECTILE DYSFUNCTION, UNSPECIFIED ERECTILE DYSFUNCTION TYPE: ICD-10-CM

## 2024-08-13 RX ORDER — TADALAFIL 20 MG/1
20 TABLET ORAL DAILY PRN
Qty: 30 TABLET | Refills: 1 | Status: SHIPPED | OUTPATIENT
Start: 2024-08-13 | End: 2025-08-13

## 2024-08-13 NOTE — TELEPHONE ENCOUNTER
No care due was identified.  [facility] Embedded Care Due Messages. Reference number: [id number].   8/13/2024 10:11:08 AM CDT

## 2024-09-19 ENCOUNTER — LAB VISIT (OUTPATIENT)
Dept: LAB | Facility: HOSPITAL | Age: 64
End: 2024-09-19
Attending: INTERNAL MEDICINE
Payer: COMMERCIAL

## 2024-09-19 DIAGNOSIS — E78.2 HYPERLIPIDEMIA, MIXED: ICD-10-CM

## 2024-09-19 DIAGNOSIS — E11.9 TYPE 2 DIABETES MELLITUS WITHOUT COMPLICATION, WITHOUT LONG-TERM CURRENT USE OF INSULIN: ICD-10-CM

## 2024-09-19 LAB
ALBUMIN SERPL BCP-MCNC: 4.1 G/DL (ref 3.5–5.2)
ALP SERPL-CCNC: 63 U/L (ref 55–135)
ALT SERPL W/O P-5'-P-CCNC: 25 U/L (ref 10–44)
ANION GAP SERPL CALC-SCNC: 9 MMOL/L (ref 8–16)
AST SERPL-CCNC: 19 U/L (ref 10–40)
BILIRUB SERPL-MCNC: 0.7 MG/DL (ref 0.1–1)
BUN SERPL-MCNC: 18 MG/DL (ref 8–23)
CALCIUM SERPL-MCNC: 9.3 MG/DL (ref 8.7–10.5)
CHLORIDE SERPL-SCNC: 98 MMOL/L (ref 95–110)
CHOLEST SERPL-MCNC: 195 MG/DL (ref 120–199)
CHOLEST/HDLC SERPL: 5.7 {RATIO} (ref 2–5)
CO2 SERPL-SCNC: 26 MMOL/L (ref 23–29)
CREAT SERPL-MCNC: 1.4 MG/DL (ref 0.5–1.4)
EST. GFR  (NO RACE VARIABLE): 57.2 ML/MIN/1.73 M^2
ESTIMATED AVG GLUCOSE: 220 MG/DL (ref 68–131)
GLUCOSE SERPL-MCNC: 289 MG/DL (ref 70–110)
HBA1C MFR BLD: 9.3 % (ref 4–5.6)
HDLC SERPL-MCNC: 34 MG/DL (ref 40–75)
HDLC SERPL: 17.4 % (ref 20–50)
LDLC SERPL CALC-MCNC: 105.2 MG/DL (ref 63–159)
NONHDLC SERPL-MCNC: 161 MG/DL
POTASSIUM SERPL-SCNC: 4 MMOL/L (ref 3.5–5.1)
PROT SERPL-MCNC: 7.1 G/DL (ref 6–8.4)
SODIUM SERPL-SCNC: 133 MMOL/L (ref 136–145)
TRIGL SERPL-MCNC: 279 MG/DL (ref 30–150)

## 2024-09-19 PROCEDURE — 80053 COMPREHEN METABOLIC PANEL: CPT | Performed by: INTERNAL MEDICINE

## 2024-09-19 PROCEDURE — 83036 HEMOGLOBIN GLYCOSYLATED A1C: CPT | Performed by: INTERNAL MEDICINE

## 2024-09-19 PROCEDURE — 80061 LIPID PANEL: CPT | Performed by: INTERNAL MEDICINE

## 2024-09-23 NOTE — PROGRESS NOTES
Blood sugar control significantly worse than previous.  Was supposed to see us today but did not show.  Please reschedule.

## 2025-01-15 DIAGNOSIS — E11.9 TYPE 2 DIABETES MELLITUS WITHOUT COMPLICATION: ICD-10-CM

## 2025-02-06 DIAGNOSIS — E11.9 TYPE 2 DIABETES MELLITUS WITHOUT COMPLICATION, WITHOUT LONG-TERM CURRENT USE OF INSULIN: ICD-10-CM

## 2025-02-06 RX ORDER — METFORMIN HYDROCHLORIDE 500 MG/1
500 TABLET ORAL 2 TIMES DAILY WITH MEALS
Qty: 180 TABLET | Refills: 0 | Status: SHIPPED | OUTPATIENT
Start: 2025-02-06

## 2025-02-06 RX ORDER — HYDROCHLOROTHIAZIDE 12.5 MG/1
12.5 TABLET ORAL
Qty: 90 TABLET | Refills: 0 | Status: SHIPPED | OUTPATIENT
Start: 2025-02-06

## 2025-02-06 RX ORDER — ATORVASTATIN CALCIUM 10 MG/1
10 TABLET, FILM COATED ORAL
Qty: 90 TABLET | Refills: 0 | Status: SHIPPED | OUTPATIENT
Start: 2025-02-06

## 2025-02-06 NOTE — TELEPHONE ENCOUNTER
Care Due:                  Date            Visit Type   Department     Provider  --------------------------------------------------------------------------------                                EP -                              PRIMARY      [facility] PRIMARY  Last Visit: 03-      CARE ([facility])   CARE           Bethel Marrufo  Next Visit: None Scheduled  None         None Found                                                            Last  Test          Frequency    Reason                     Performed    Due Date  --------------------------------------------------------------------------------    HBA1C.......  6 months...  metFORMIN................  09- 03-    [facility] Embedded Care Due Messages. Reference number: [id number].   2/06/2025 5:18:42 AM CST

## 2025-02-06 NOTE — TELEPHONE ENCOUNTER
Provider Staff:  Action required for this patient    Requires labs      Please see care gap opportunities below in Care Due Message.    Thanks!  [facility]     Appointments      Date Provider   Last Visit   3/18/2024 Bethel Echols MD   Next Visit   Visit date not found Bethel Echols MD     Refill Decision Note   Cristhian Gregory  is requesting a refill authorization.  Brief Assessment and Rationale for Refill:  Approve     Medication Therapy Plan:         Comments:     Note composed:10:43 AM 02/06/2025

## 2025-02-19 DIAGNOSIS — N52.9 ERECTILE DYSFUNCTION, UNSPECIFIED ERECTILE DYSFUNCTION TYPE: ICD-10-CM

## 2025-02-19 RX ORDER — TADALAFIL 20 MG/1
20 TABLET ORAL DAILY PRN
Qty: 30 TABLET | Refills: 1 | Status: SHIPPED | OUTPATIENT
Start: 2025-02-19 | End: 2026-02-19

## 2025-03-20 ENCOUNTER — PATIENT OUTREACH (OUTPATIENT)
Dept: INTERNAL MEDICINE | Facility: CLINIC | Age: 65
End: 2025-03-20
Payer: COMMERCIAL

## 2025-03-20 ENCOUNTER — PATIENT MESSAGE (OUTPATIENT)
Dept: PRIMARY CARE CLINIC | Facility: CLINIC | Age: 65
End: 2025-03-20
Payer: COMMERCIAL

## 2025-03-20 DIAGNOSIS — E11.9 TYPE 2 DIABETES MELLITUS WITHOUT COMPLICATION, WITHOUT LONG-TERM CURRENT USE OF INSULIN: Primary | ICD-10-CM

## 2025-03-21 ENCOUNTER — LAB VISIT (OUTPATIENT)
Dept: LAB | Facility: HOSPITAL | Age: 65
End: 2025-03-21
Payer: COMMERCIAL

## 2025-03-21 DIAGNOSIS — E11.9 TYPE 2 DIABETES MELLITUS WITHOUT COMPLICATION: ICD-10-CM

## 2025-03-21 LAB
ESTIMATED AVG GLUCOSE: 252 MG/DL (ref 68–131)
HBA1C MFR BLD: 10.4 % (ref 4–5.6)

## 2025-03-21 PROCEDURE — 83036 HEMOGLOBIN GLYCOSYLATED A1C: CPT | Performed by: INTERNAL MEDICINE

## 2025-03-21 PROCEDURE — 36415 COLL VENOUS BLD VENIPUNCTURE: CPT | Mod: PN | Performed by: INTERNAL MEDICINE

## 2025-03-23 ENCOUNTER — RESULTS FOLLOW-UP (OUTPATIENT)
Dept: PRIMARY CARE CLINIC | Facility: CLINIC | Age: 65
End: 2025-03-23

## 2025-03-24 ENCOUNTER — OFFICE VISIT (OUTPATIENT)
Dept: PRIMARY CARE CLINIC | Facility: CLINIC | Age: 65
End: 2025-03-24
Payer: COMMERCIAL

## 2025-03-24 ENCOUNTER — PATIENT OUTREACH (OUTPATIENT)
Dept: ADMINISTRATIVE | Facility: HOSPITAL | Age: 65
End: 2025-03-24
Payer: COMMERCIAL

## 2025-03-24 VITALS
DIASTOLIC BLOOD PRESSURE: 76 MMHG | BODY MASS INDEX: 27.94 KG/M2 | WEIGHT: 195.13 LBS | HEIGHT: 70 IN | SYSTOLIC BLOOD PRESSURE: 120 MMHG | OXYGEN SATURATION: 98 % | HEART RATE: 95 BPM

## 2025-03-24 DIAGNOSIS — E11.9 TYPE 2 DIABETES MELLITUS WITHOUT COMPLICATION, WITHOUT LONG-TERM CURRENT USE OF INSULIN: ICD-10-CM

## 2025-03-24 DIAGNOSIS — Z00.00 ANNUAL PHYSICAL EXAM: Primary | ICD-10-CM

## 2025-03-24 DIAGNOSIS — I10 ESSENTIAL HYPERTENSION: ICD-10-CM

## 2025-03-24 DIAGNOSIS — Z13.6 ENCOUNTER FOR SCREENING FOR CARDIOVASCULAR DISORDERS: ICD-10-CM

## 2025-03-24 DIAGNOSIS — C09.9 SQUAMOUS CELL CARCINOMA OF LEFT TONSIL: ICD-10-CM

## 2025-03-24 PROCEDURE — 99999 PR PBB SHADOW E&M-EST. PATIENT-LVL IV: CPT | Mod: PBBFAC,,, | Performed by: INTERNAL MEDICINE

## 2025-03-24 NOTE — PROGRESS NOTES
[facility] Note    Chief Complaint      Chief Complaint   Patient presents with    Annual Exam       History of Present Illness      History of Present Illness    CHIEF COMPLAINT:  Mr. Gregory presents today for follow up of depression and diabetes management    DEPRESSION:  He reports feeling depressed, which he attributes to ongoing issues since he lost his wife in 2022.    DIABETES:  His A1C has increased from 9.3 in September to 10.4 currently. He reports eliminating Coke Zero and limiting sugar and sweets intake. He notes decreased physical activity due to a less demanding job and is interested in incorporating more leisure activities like golf to compensate for reduced work-related movement.    BLOOD PRESSURE:  He reports experiencing less dizziness when tying shoes during a 3-4 day period when he ran out of Losartan. He has since resumed the medication as prescribed.    PREVENTIVE CARE:  He receives annual eye exams. His last flu vaccine was in 2021. Tetanus vaccination is current from 2019. He has been contacted about scheduling a colonoscopy due this year but expresses ambivalence about proceeding.      ROS:  Neurological: -dizziness  Psychiatric: +depression       DM2: A1C 10.6, up from 9.3.  On metformin.  On lipitor for statin indication with diabetes.  Eye exam scheduled with Dr. Villa.  HTN: BP at goal on losartan, hctz.    Squamous cell carcinoma, left tonsil: Surveillance has been doing well.      Flu shot missed this season.  TdAP 2019.  Shingles vaccine due.  Pneumovax UTD.  Prevnar due age 65.  COVID vaccine UTD.  Cscope, Dr. Tejada, 2020, polyp, 5 yr interval.      Assessment/Plan     Cristhian Gregory is a 64 y.o.male with:    Assessment & Plan    Assessed diabetes control via A1C trend, noting increase from 9.3 to 10.4.  Discussed progression of diabetes, including insulin resistance and potential pancreatic insufficiency.  Weight gain (up 10-15 lbs) is a significant factor in  blood sugar control.  Blood pressure management is well-controlled on current regimen.  Reviewed vaccination status and cancer screening needs.    TYPE 2 DIABETES MELLITUS:  - Monitored the patient's A1C, which has increased to 10.4 from 9.3 in September.  - Noted the patient's weight gain, now at 195 lbs from 182 lbs at the last visit.  - Explained the A1C test and its significance in monitoring long-term glucose control over a 3-month period.  - Assessed that the patient's diabetes is progressing, with the body developing insulin resistance.  - Discussed the impact of weight gain on glucose control and the need for increased physical activity.  - Recommend weight loss and increased physical activity as primary treatment.  - Ordered complete labs 1 week prior to next appointment.  - Recommend increasing physical activity, particularly walking while golfing.  - Mr. Gregory to aim to lose 10-15 lbs to improve blood sugar control.    MAJOR DEPRESSIVE DISORDER:  - Noted that the patient reports feeling depressed due to recent life events.    ESSENTIAL HYPERTENSION:  - Monitored the patient's blood pressure, which looks good despite running out of Losartan for 3-4 days.  - Noted that patient reports feeling less dizzy during the medication lapse.  - Explained hypertension's effects on organ function, particularly heart and kidneys.  - Continued Losartan at the current dose.  - Ordered CT Calcium Score to assess cardiovascular risk, given the patient's risk factors.    IMMUNIZATION:  - Reviewed the patient's vaccination history, noting last influenza vaccination in 2021 and tetanus vaccination in 2019.  - Discussed various vaccine options including influenza, tetanus, herpes zoster, pneumococcal, and COVID-19.  - Recommend influenza vaccination in October and herpes zoster vaccine.  - Explained the herpes zoster vaccine, including efficacy and potential side effects.  - Advised that the patient can receive the herpes  zoster vaccine at a local pharmacy.    FOLLOW-UP:  - Scheduled a follow-up visit in 3 months.  - Contact colonoscopy provider to schedule procedure before next visit.  - Follow up in 3 months.         1. Annual physical exam    2. Type 2 diabetes mellitus without complication, without long-term current use of insulin  - Basic Metabolic Panel; Future  - Hemoglobin A1C; Future  - CT Calcium Scoring Cardiac; Future  - CT Calcium Scoring Cardiac    3. Essential hypertension  - CT Calcium Scoring Cardiac; Future  - CT Calcium Scoring Cardiac    4. Squamous cell carcinoma of left tonsil    5. Encounter for screening for cardiovascular disorders  - CT Calcium Scoring Cardiac; Future  - CT Calcium Scoring Cardiac      Chronic conditions status updated as per HPI.  Other than changes above, cont current medications and maintain follow up with specialists.  No follow-ups on file.    Future Appointments   Date Time Provider Department Center   7/7/2025 10:00 AM Bethel Echols MD [facility]           Past Medical History:  Past Medical History:   Diagnosis Date    Diabetes mellitus, type 2     Hypertension        Past Surgical History:   has no past surgical history on file.    Family History:  family history includes Arthritis in his mother; Cancer in his mother; Diabetes in his mother; Heart disease in his father; No Known Problems in his brother and sister.     Social History:  Social History[1]    Medications:  Encounter Medications[2]    Allergies:  Review of patient's allergies indicates:   Allergen Reactions    Ampicillin        Health Maintenance:  Immunization History   Administered Date(s) Administered    COVID-19, MRNA, LN-S, PF (MODERNA FULL 0.5 ML DOSE) 02/27/2021, 03/27/2021    Influenza 12/10/2021    Influenza - Quadrivalent - MDCK - PF 12/17/2021    Influenza - Quadrivalent - PF *Preferred* (6 months and older) 11/25/2019, 09/11/2020    Pneumococcal Polysaccharide - 23 Valent 03/02/2020    Tdap  "11/25/2019      Health Maintenance   Topic Date Due    Shingles Vaccine (1 of 2) Never done    Diabetic Eye Exam  06/26/2020    Pneumococcal Vaccines (Age 50+) (2 of 2 - PCV) 03/02/2021    COVID-19 Vaccine (3 - Moderna risk series) 04/24/2021    Foot Exam  05/17/2022    Influenza Vaccine (1) 09/01/2024    Colorectal Cancer Screening  01/09/2025    Hemoglobin A1c  06/21/2025    Lipid Panel  09/19/2025    Low Dose Statin  02/06/2026    Diabetes Urine Screening  03/21/2026    TETANUS VACCINE  11/25/2029    RSV Vaccine (Age 60+ and Pregnant patients) (1 - 1-dose 75+ series) 12/19/2037    Hepatitis C Screening  Completed    HIV Screening  Completed        Physical Exam      Vital Signs  Pulse: 95  SpO2: 98 %  BP: 120/76  BP Location: Left arm  Patient Position: Sitting  Pain Score: 0-No pain  Height and Weight  Height: 5' 10" (177.8 cm)  Weight: 88.5 kg (195 lb 1.7 oz)  BSA (Calculated - sq m): 2.09 sq meters  BMI (Calculated): 28  Weight in (lb) to have BMI = 25: 173.9]    Physical Exam    General: No acute distress. Well-developed. Well-nourished.  Eyes: EOMI. Sclerae anicteric.  HENT: Normocephalic. Atraumatic. Nares patent. Moist oral mucosa.  Ears: Bilateral TMs clear. Bilateral EACs clear.  Cardiovascular: Regular rate. Regular rhythm. No murmurs. No rubs. No gallops. Normal S1, S2. Normal carotid sounds.  Respiratory: Normal respiratory effort. Clear to auscultation bilaterally. No rales. No rhonchi. No wheezing.  Abdomen: Soft. Non-tender. Non-distended. Normoactive bowel sounds.  Musculoskeletal: No  obvious deformity.  Extremities: No lower extremity edema.  Neurological: Alert & oriented x3. No slurred speech. Normal gait.  Psychiatric: Normal mood. Normal affect. Good insight. Good judgment.  Skin: Warm. Dry. No rash.         Physical Exam  Vitals reviewed.   Constitutional:       Appearance: He is well-developed.   HENT:      Head: Normocephalic and atraumatic.      Right Ear: External ear normal.      Left " Ear: External ear normal.   Cardiovascular:      Rate and Rhythm: Normal rate and regular rhythm.      Heart sounds: Normal heart sounds. No murmur heard.  Pulmonary:      Effort: Pulmonary effort is normal.      Breath sounds: Normal breath sounds. No wheezing or rales.   Abdominal:      General: Bowel sounds are normal.      Palpations: Abdomen is soft.         Laboratory:    Results              CBC:  Recent Labs   Lab 03/08/23  0915 03/14/24  0730   WBC 5.28 4.64   RBC 5.02 4.96   Hemoglobin 14.5 14.6   Hematocrit 44.6 44.4   Platelets 266 208   MCV 89 90   MCH 28.9 29.4   MCHC 32.5 32.9     CMP:  Recent Labs   Lab 09/12/23  0726 03/14/24  0730 09/19/24  0740   Glucose 106 136 H 289 H   Calcium 9.3 9.6 9.3   Albumin 4.2 4.2 4.1   Total Protein 7.1 7.1 7.1   Sodium 137 140 133 L   Potassium 3.6 3.9 4.0   CO2 24 28 26   Chloride 102 103 98   BUN 15 19 18   Alkaline Phosphatase 38 L 54 L 63   ALT 16 19 25   AST 22 20 19   Total Bilirubin 0.8 0.6 0.7     URINALYSIS:       LIPIDS:  Recent Labs   Lab 03/08/23  0915 03/14/24  0730 09/19/24  0740   TSH 1.907 2.362  --    HDL 37 L 37 L 34 L   Cholesterol 159 201 H 195   Triglycerides 148 166 H 279 H   LDL Cholesterol 92.4 130.8 105.2   HDL/Cholesterol Ratio 23.3 18.4 L 17.4 L   Non-HDL Cholesterol 122 164 161   Total Cholesterol/HDL Ratio 4.3 5.4 H 5.7 H     TSH:  Recent Labs   Lab 03/08/23  0915 03/14/24  0730   TSH 1.907 2.362     A1C:  Recent Labs   Lab 03/08/23  0915 09/12/23  0726 03/14/24  0730 09/19/24  0740 03/21/25  0712   Hemoglobin A1C 7.2 H 6.4 H 7.2 H 9.3 H 10.4 H           This note was generated with the assistance of ambient listening technology. Verbal consent was obtained by the patient and accompanying visitor(s) for the recording of patient appointment to facilitate this note. I attest to having reviewed and edited the generated note for accuracy, though some syntax or spelling errors may persist. Please contact the author of this note for any  clarification.      Bethel Echols MD  [facility]                       [1]   Social History  Tobacco Use    Smoking status: Never    Smokeless tobacco: Never   Substance Use Topics    Alcohol use: Not Currently    Drug use: Not Currently   [2]   Outpatient Encounter Medications as of 3/24/2025   Medication Sig Dispense Refill    atorvastatin (LIPITOR) 10 MG tablet TAKE 1 TABLET(10 MG) BY MOUTH EVERY DAY 90 tablet 0    blood sugar diagnostic (TRUE METRIX GLUCOSE TEST STRIP) Strp 1 strip by Misc.(Non-Drug; Combo Route) route 2 (two) times a day. 200 each 3    blood-glucose meter kit Use as instructed 1 each 0    diazePAM (VALIUM) 5 MG tablet Take 1 tablet (5 mg total) by mouth once. Take the day of prior to coming in for procedure. for 1 dose 1 tablet 0    hydroCHLOROthiazide 12.5 MG Tab TAKE 1 TABLET(12.5 MG) BY MOUTH EVERY DAY 90 tablet 0    hydrocortisone 2.5 % cream Apply topically 2 (two) times daily. (Patient not taking: Reported on 11/6/2023) 20 g 3    lancets Misc 1 each by Misc.(Non-Drug; Combo Route) route once daily. 100 each 11    losartan (COZAAR) 100 MG tablet Take 1 tablet (100 mg total) by mouth once daily. 90 tablet 3    metFORMIN (GLUCOPHAGE) 500 MG tablet TAKE 1 TABLET(500 MG) BY MOUTH TWICE DAILY WITH MEALS 180 tablet 0    tadalafiL (CIALIS) 20 MG Tab Take 1 tablet (20 mg total) by mouth daily as needed (30-60 min prior to intercourse). 30 tablet 1     No facility-administered encounter medications on file as of 3/24/2025.

## 2025-03-24 NOTE — LETTER
AUTHORIZATION FOR RELEASE OF   CONFIDENTIAL INFORMATION    Dear Eloyr.,    We are seeing Cristhian Gregory, date of birth 1960, in the clinic at [facility]. Bethel Echols MD is the patient's PCP. Cristhian Gregory has an outstanding lab/procedure at the time we reviewed his chart. In order to help keep his health information updated, he has authorized us to request the following medical record(s):        (  )  MAMMOGRAM                                      (  )  COLONOSCOPY      (  )  PAP SMEAR                                          (  )  OUTSIDE LAB RESULTS     (  )  DEXA SCAN                                          (X)  DIABETIC EYE EXAM            (  )  FOOT EXAM                                          (  )  ENTIRE RECORD     (  )  OUTSIDE IMMUNIZATIONS                 (  )  _______________         Please fax records to [facility], Klibert, David M., MD,  at [phone] or email to [email or web address].       If you have any questions, please contact Bessie at [phone].           Patient Name: Cristhian Gregory  : 1960  Patient Phone #: [phone]

## 2025-03-24 NOTE — PROGRESS NOTES
Health Maintenance Due   Topic Date Due    Shingles Vaccine (1 of 2) Never done    Diabetic Eye Exam  06/26/2020    Pneumococcal Vaccines (Age 50+) (2 of 2 - PCV) 03/02/2021    COVID-19 Vaccine (3 - Moderna risk series) 04/24/2021    Foot Exam  05/17/2022    Influenza Vaccine (1) 09/01/2024    Colorectal Cancer Screening  01/09/2025     Chart review completed. HM Updated. Triggered Links. Immunizations reviewed and updated. Care Everywhere Updated. Care Team Updated.  Requested eye exam records from [facility].

## 2025-05-01 DIAGNOSIS — E11.9 TYPE 2 DIABETES MELLITUS WITHOUT COMPLICATION, UNSPECIFIED WHETHER LONG TERM INSULIN USE: ICD-10-CM

## 2025-05-14 DIAGNOSIS — E11.9 TYPE 2 DIABETES MELLITUS WITHOUT COMPLICATION, WITHOUT LONG-TERM CURRENT USE OF INSULIN: ICD-10-CM

## 2025-05-14 RX ORDER — LOSARTAN POTASSIUM 100 MG/1
100 TABLET ORAL DAILY
Qty: 90 TABLET | Refills: 3 | Status: SHIPPED | OUTPATIENT
Start: 2025-05-14

## 2025-05-14 RX ORDER — ATORVASTATIN CALCIUM 10 MG/1
10 TABLET, FILM COATED ORAL DAILY
Qty: 90 TABLET | Refills: 0 | Status: SHIPPED | OUTPATIENT
Start: 2025-05-14

## 2025-05-14 RX ORDER — HYDROCHLOROTHIAZIDE 12.5 MG/1
12.5 TABLET ORAL DAILY
Qty: 90 TABLET | Refills: 0 | Status: SHIPPED | OUTPATIENT
Start: 2025-05-14

## 2025-05-14 NOTE — TELEPHONE ENCOUNTER
No care due was identified.  [facility] Embedded Care Due Messages. Reference number: [id number].   5/14/2025 1:07:35 PM CDT

## 2025-05-15 DIAGNOSIS — E11.9 TYPE 2 DIABETES MELLITUS WITHOUT COMPLICATION, WITHOUT LONG-TERM CURRENT USE OF INSULIN: ICD-10-CM

## 2025-05-15 RX ORDER — METFORMIN HYDROCHLORIDE 500 MG/1
500 TABLET ORAL 2 TIMES DAILY WITH MEALS
Qty: 180 TABLET | Refills: 0 | Status: SHIPPED | OUTPATIENT
Start: 2025-05-15

## 2025-05-15 NOTE — TELEPHONE ENCOUNTER
No care due was identified.  [facility] Embedded Care Due Messages. Reference number: [id number].   5/15/2025 3:40:23 PM CDT

## 2025-05-15 NOTE — TELEPHONE ENCOUNTER
----- Message from [place] sent at 5/15/2025  3:36 PM CDT -----  Contact: [phone]  Requesting an RX refill or new RX.Is this a refill or new RX: refillRX name and strength (copy/paste from chart):  metFORMIN (GLUCOPHAGE) 500 MG tablet Is this a 30 day or 90 day RX: 180Pharmacy name and phone # (copy/paste from chart):  [id number] - [place], [place] - [address] AT [facility] [address] [id number]: [phone] Fax: [phone]

## 2025-06-23 ENCOUNTER — PATIENT MESSAGE (OUTPATIENT)
Dept: PRIMARY CARE CLINIC | Facility: CLINIC | Age: 65
End: 2025-06-23
Payer: COMMERCIAL

## 2025-07-08 ENCOUNTER — LAB VISIT (OUTPATIENT)
Dept: LAB | Facility: HOSPITAL | Age: 65
End: 2025-07-08
Attending: INTERNAL MEDICINE
Payer: COMMERCIAL

## 2025-07-08 DIAGNOSIS — E11.9 TYPE 2 DIABETES MELLITUS WITHOUT COMPLICATION, WITHOUT LONG-TERM CURRENT USE OF INSULIN: ICD-10-CM

## 2025-07-08 LAB
ANION GAP (OHS): 9 MMOL/L (ref 8–16)
BUN SERPL-MCNC: 19 MG/DL (ref 8–23)
CALCIUM SERPL-MCNC: 8.6 MG/DL (ref 8.7–10.5)
CHLORIDE SERPL-SCNC: 101 MMOL/L (ref 95–110)
CO2 SERPL-SCNC: 26 MMOL/L (ref 23–29)
CREAT SERPL-MCNC: 1.2 MG/DL (ref 0.5–1.4)
EAG (OHS): 249 MG/DL (ref 68–131)
GFR SERPLBLD CREATININE-BSD FMLA CKD-EPI: >60 ML/MIN/1.73/M2
GLUCOSE SERPL-MCNC: 269 MG/DL (ref 70–110)
HBA1C MFR BLD: 10.3 % (ref 4–5.6)
POTASSIUM SERPL-SCNC: 4.2 MMOL/L (ref 3.5–5.1)
SODIUM SERPL-SCNC: 136 MMOL/L (ref 136–145)

## 2025-07-08 PROCEDURE — 83036 HEMOGLOBIN GLYCOSYLATED A1C: CPT

## 2025-07-08 PROCEDURE — 82310 ASSAY OF CALCIUM: CPT

## 2025-07-08 PROCEDURE — 36415 COLL VENOUS BLD VENIPUNCTURE: CPT | Mod: PN

## 2025-07-09 ENCOUNTER — RESULTS FOLLOW-UP (OUTPATIENT)
Dept: PRIMARY CARE CLINIC | Facility: CLINIC | Age: 65
End: 2025-07-09
Payer: COMMERCIAL

## 2025-07-09 ENCOUNTER — PATIENT OUTREACH (OUTPATIENT)
Dept: ADMINISTRATIVE | Facility: HOSPITAL | Age: 65
End: 2025-07-09
Payer: COMMERCIAL

## 2025-07-10 ENCOUNTER — OFFICE VISIT (OUTPATIENT)
Dept: PRIMARY CARE CLINIC | Facility: CLINIC | Age: 65
End: 2025-07-10
Payer: COMMERCIAL

## 2025-07-10 VITALS
OXYGEN SATURATION: 98 % | BODY MASS INDEX: 27.21 KG/M2 | RESPIRATION RATE: 18 BRPM | SYSTOLIC BLOOD PRESSURE: 124 MMHG | HEART RATE: 91 BPM | WEIGHT: 190.06 LBS | HEIGHT: 70 IN | DIASTOLIC BLOOD PRESSURE: 84 MMHG

## 2025-07-10 DIAGNOSIS — I10 ESSENTIAL HYPERTENSION: ICD-10-CM

## 2025-07-10 DIAGNOSIS — E11.9 TYPE 2 DIABETES MELLITUS WITHOUT COMPLICATION, WITHOUT LONG-TERM CURRENT USE OF INSULIN: Primary | ICD-10-CM

## 2025-07-10 PROCEDURE — 3079F DIAST BP 80-89 MM HG: CPT | Mod: CPTII,S$GLB,, | Performed by: NURSE PRACTITIONER

## 2025-07-10 PROCEDURE — 1159F MED LIST DOCD IN RCRD: CPT | Mod: CPTII,S$GLB,, | Performed by: NURSE PRACTITIONER

## 2025-07-10 PROCEDURE — 3066F NEPHROPATHY DOC TX: CPT | Mod: CPTII,S$GLB,, | Performed by: NURSE PRACTITIONER

## 2025-07-10 PROCEDURE — 3061F NEG MICROALBUMINURIA REV: CPT | Mod: CPTII,S$GLB,, | Performed by: NURSE PRACTITIONER

## 2025-07-10 PROCEDURE — 3008F BODY MASS INDEX DOCD: CPT | Mod: CPTII,S$GLB,, | Performed by: NURSE PRACTITIONER

## 2025-07-10 PROCEDURE — 99999 PR PBB SHADOW E&M-EST. PATIENT-LVL III: CPT | Mod: PBBFAC,,, | Performed by: NURSE PRACTITIONER

## 2025-07-10 PROCEDURE — 99214 OFFICE O/P EST MOD 30 MIN: CPT | Mod: S$GLB,,, | Performed by: NURSE PRACTITIONER

## 2025-07-10 PROCEDURE — 4010F ACE/ARB THERAPY RXD/TAKEN: CPT | Mod: CPTII,S$GLB,, | Performed by: NURSE PRACTITIONER

## 2025-07-10 PROCEDURE — 3046F HEMOGLOBIN A1C LEVEL >9.0%: CPT | Mod: CPTII,S$GLB,, | Performed by: NURSE PRACTITIONER

## 2025-07-10 PROCEDURE — 3074F SYST BP LT 130 MM HG: CPT | Mod: CPTII,S$GLB,, | Performed by: NURSE PRACTITIONER

## 2025-07-10 NOTE — PROGRESS NOTES
[facility] Note    Chief Complaint      Chief Complaint   Patient presents with    Follow-up       History of Present Illness      Cristhian Gregory is a 64 y.o. male with chronic conditions of Dm2, htn who presents today for: follow up for diabetes management. Last A1C 10.4%, most recent A1C 10.3%. Currently only on metformin.   Has been dieting, and trying to loose weight. Has lost 5 lbs.   Has been stressed lately with wife's passing, and estates.    Has colonoscopy scheduled next Tuesday.      Past Medical History:  Past Medical History:   Diagnosis Date    Diabetes mellitus, type 2     Hypertension        Past Surgical History:   has no past surgical history on file.    Family History:  family history includes Arthritis in his mother; Cancer in his mother; Diabetes in his mother; Heart disease in his father; No Known Problems in his brother and sister.     Social History:  Social History[1]    Review of Systems   Constitutional:  Negative for chills and fever.   Respiratory:  Negative for cough and shortness of breath.    Cardiovascular:  Negative for chest pain and palpitations.   Gastrointestinal:  Negative for constipation, diarrhea, nausea and vomiting.   Genitourinary:  Negative for dysuria and hematuria.   Musculoskeletal:  Negative for falls.   Neurological:  Negative for headaches.        Medications:  Encounter Medications[2]    Allergies:  Review of patient's allergies indicates:   Allergen Reactions    Ampicillin        Health Maintenance:  Immunization History   Administered Date(s) Administered    COVID-19, MRNA, LN-S, PF (MODERNA FULL 0.5 ML DOSE) 02/27/2021, 03/27/2021    Influenza 12/10/2021    Influenza - Quadrivalent - MDCK - PF 12/17/2021    Influenza - Quadrivalent - PF *Preferred* (6 months and older) 11/25/2019, 09/11/2020    Pneumococcal Polysaccharide - 23 Valent 03/02/2020    Tdap 11/25/2019      Health Maintenance   Topic Date Due    Shingles Vaccine (1 of 2) Never done  "   Diabetic Eye Exam  06/26/2020    Pneumococcal Vaccines (Age 50+) (2 of 2 - PCV) 03/02/2021    COVID-19 Vaccine (3 - Moderna risk series) 04/24/2021    RSV Vaccine (Age 60+ and Pregnant patients) (1 - Risk 60-74 years 1-dose series) Never done    Colorectal Cancer Screening  01/09/2025    Foot Exam  07/10/2025    Influenza Vaccine (1) 09/01/2025    Lipid Panel  09/19/2025    Hemoglobin A1c  10/08/2025    Diabetes Urine Screening  03/21/2026    Low Dose Statin  07/10/2026    TETANUS VACCINE  11/25/2029    Hepatitis C Screening  Completed    HIV Screening  Completed        Physical Exam      Vital Signs  Pulse: 91  Resp: 18  SpO2: 98 %  BP: 124/84  BP Location: Right arm  Patient Position: Sitting  Pain Score: 0-No pain  Height and Weight  Height: 5' 10" (177.8 cm)  Weight: 86.2 kg (190 lb 0.6 oz)  BSA (Calculated - sq m): 2.06 sq meters  BMI (Calculated): 27.3  Weight in (lb) to have BMI = 25: 173.9]    Physical Exam  Constitutional:       Appearance: He is well-developed.   HENT:      Head: Normocephalic and atraumatic.   Neck:      Thyroid: No thyromegaly.   Cardiovascular:      Rate and Rhythm: Normal rate and regular rhythm.      Heart sounds: No murmur heard.  Pulmonary:      Effort: Pulmonary effort is normal. No respiratory distress.      Breath sounds: Normal breath sounds.   Abdominal:      General: There is no distension.      Palpations: Abdomen is soft.      Tenderness: There is no abdominal tenderness.   Skin:     General: Skin is warm and dry.   Neurological:      Mental Status: He is alert and oriented to person, place, and time.   Psychiatric:         Behavior: Behavior normal.          Laboratory:  CBC:  Recent Labs   Lab 03/08/23  0915 03/14/24  0730   WBC 5.28 4.64   RBC 5.02 4.96   Hemoglobin 14.5 14.6   Hematocrit 44.6 44.4   Platelets 266 208   MCV 89 90   MCH 28.9 29.4   MCHC 32.5 32.9     CMP:  Recent Labs   Lab 09/12/23  0726 03/14/24  0730 09/19/24  0740 07/08/25  0959   Glucose 106 136 H " 289 H 269 H   Calcium 9.3 9.6 9.3 8.6 L   Albumin 4.2 4.2 4.1  --    Total Protein 7.1 7.1 7.1  --    Sodium 137 140 133 L 136   Potassium 3.6 3.9 4.0 4.2   CO2 24 28 26 26   Chloride 102 103 98 101   BUN 15 19 18 19   Alkaline Phosphatase 38 L 54 L 63  --    ALT 16 19 25  --    AST 22 20 19  --    Total Bilirubin 0.8 0.6 0.7  --      URINALYSIS:       LIPIDS:  Recent Labs   Lab 03/08/23  0915 03/14/24  0730 09/19/24  0740   TSH 1.907 2.362  --    HDL 37 L 37 L 34 L   Cholesterol 159 201 H 195   Triglycerides 148 166 H 279 H   LDL Cholesterol 92.4 130.8 105.2   HDL/Cholesterol Ratio 23.3 18.4 L 17.4 L   Non-HDL Cholesterol 122 164 161   Total Cholesterol/HDL Ratio 4.3 5.4 H 5.7 H     TSH:  Recent Labs   Lab 03/08/23  0915 03/14/24  0730   TSH 1.907 2.362     A1C:  Recent Labs   Lab 03/08/23  0915 09/12/23  0726 03/14/24  0730 09/19/24  0740 03/21/25  0712 07/08/25  0959   Hemoglobin A1C 7.2 H 6.4 H 7.2 H 9.3 H 10.4 H  --    Hemoglobin A1c  --   --   --   --   --  10.3 H       Assessment/Plan     Cristhian Gregory is a 64 y.o.male with:    1. Type 2 diabetes mellitus without complication, without long-term current use of insulin  - Hemoglobin A1C; Future  - Basic Metabolic Panel; Future  - Basic Metabolic Panel; Future  - pt utd on preventative screening, states saw podiatrist, and due for ophthalmologist this fall. Will get scans.   - has colonoscopy already scheduled.   - will get A1c, BmP in 3 months, and a BMP in 1 month.   - will increase metformin 1000mg BID  - pt will start checking bs at home as well and send us readings. Continue diet and exercise.     2. Essential hypertension   Bp at goal. Continue current meds.        Chronic conditions status updated as per HPI.  Other than changes above, cont current medications and maintain follow up with specialists.  No follow-ups on file.    No future appointments.      Adrienne Cotaya, FNP [facility]                       [1]   Social History  Tobacco Use     Smoking status: Never    Smokeless tobacco: Never   Substance Use Topics    Alcohol use: Not Currently    Drug use: Not Currently   [2]   Outpatient Encounter Medications as of 7/10/2025   Medication Sig Dispense Refill    atorvastatin (LIPITOR) 10 MG tablet Take 1 tablet (10 mg total) by mouth once daily. 90 tablet 0    blood sugar diagnostic (TRUE METRIX GLUCOSE TEST STRIP) Strp 1 strip by Misc.(Non-Drug; Combo Route) route 2 (two) times a day. 200 each 3    blood-glucose meter kit Use as instructed 1 each 0    hydroCHLOROthiazide 12.5 MG Tab Take 1 tablet (12.5 mg total) by mouth once daily. 90 tablet 0    hydrocortisone 2.5 % cream Apply topically 2 (two) times daily. 20 g 3    lancets Misc 1 each by Misc.(Non-Drug; Combo Route) route once daily. 100 each 11    losartan (COZAAR) 100 MG tablet Take 1 tablet (100 mg total) by mouth once daily. 90 tablet 3    metFORMIN (GLUCOPHAGE) 500 MG tablet Take 1 tablet (500 mg total) by mouth 2 (two) times daily with meals. 180 tablet 0    tadalafiL (CIALIS) 20 MG Tab Take 1 tablet (20 mg total) by mouth daily as needed (30-60 min prior to intercourse). 30 tablet 1    [DISCONTINUED] diazePAM (VALIUM) 5 MG tablet Take 1 tablet (5 mg total) by mouth once. Take the day of prior to coming in for procedure. for 1 dose 1 tablet 0     No facility-administered encounter medications on file as of 7/10/2025.

## 2025-07-21 ENCOUNTER — PATIENT MESSAGE (OUTPATIENT)
Dept: ADMINISTRATIVE | Facility: HOSPITAL | Age: 65
End: 2025-07-21
Payer: COMMERCIAL

## 2025-07-22 ENCOUNTER — PATIENT OUTREACH (OUTPATIENT)
Dept: ADMINISTRATIVE | Facility: HOSPITAL | Age: 65
End: 2025-07-22
Payer: COMMERCIAL

## 2025-07-22 NOTE — PROGRESS NOTES
Requested Colon Screening from [facility].  HM and immunization's reviewed and updated.  Patient is due for Diabetic Eye Exam(order is in), Colon Screening (requested records).

## 2025-07-22 NOTE — LETTER
AUTHORIZATION FOR RELEASE OF   CONFIDENTIAL INFORMATION      We are seeing Cristhian Gregory, date of birth 1960, in the clinic at [facility]. Bethel Echols MD is the patient's PCP. Cristhian Gregory has an outstanding lab/procedure at the time we reviewed his chart. In order to help keep his health information updated, he has authorized us to request the following medical record(s):        (  )  MAMMOGRAM                                      (x )  COLONOSCOPY      (  )  PAP SMEAR                                          (  )  OUTSIDE LAB RESULTS     (  )  DEXA SCAN                                          (  )  EYE EXAM            (  )  FOOT EXAM                                          (  )  ENTIRE RECORD     (  )  OUTSIDE IMMUNIZATIONS                 (  )  _______________       Please fax records to [facility], Klibert, David M., MD  Fax number is [phone].    If you have any questions, please contact, Jennifer Figueroa MA Care Coordinator at [phone]           Patient Name: Cristhian Gregory  : 1960  Patient Phone #: [phone]

## 2025-08-09 DIAGNOSIS — E11.9 TYPE 2 DIABETES MELLITUS WITHOUT COMPLICATION, WITHOUT LONG-TERM CURRENT USE OF INSULIN: ICD-10-CM

## 2025-08-10 RX ORDER — ATORVASTATIN CALCIUM 10 MG/1
10 TABLET, FILM COATED ORAL
Qty: 90 TABLET | Refills: 0 | Status: SHIPPED | OUTPATIENT
Start: 2025-08-10

## 2025-08-10 RX ORDER — METFORMIN HYDROCHLORIDE 500 MG/1
500 TABLET ORAL 2 TIMES DAILY WITH MEALS
Qty: 180 TABLET | Refills: 0 | Status: SHIPPED | OUTPATIENT
Start: 2025-08-10

## 2025-08-10 RX ORDER — HYDROCHLOROTHIAZIDE 12.5 MG/1
TABLET ORAL
Qty: 90 TABLET | Refills: 0 | Status: SHIPPED | OUTPATIENT
Start: 2025-08-10

## 2025-08-13 DIAGNOSIS — E11.65 UNCONTROLLED TYPE 2 DIABETES MELLITUS WITH HYPERGLYCEMIA: ICD-10-CM

## 2025-08-13 RX ORDER — LANCETS
1 EACH MISCELLANEOUS DAILY
Qty: 100 EACH | Refills: 11 | Status: SHIPPED | OUTPATIENT
Start: 2025-08-13

## 2025-09-04 ENCOUNTER — TELEPHONE (OUTPATIENT)
Dept: PHARMACY | Facility: CLINIC | Age: 65
End: 2025-09-04
Payer: COMMERCIAL